# Patient Record
Sex: FEMALE | Race: BLACK OR AFRICAN AMERICAN | Employment: FULL TIME | URBAN - METROPOLITAN AREA
[De-identification: names, ages, dates, MRNs, and addresses within clinical notes are randomized per-mention and may not be internally consistent; named-entity substitution may affect disease eponyms.]

---

## 2017-11-15 ENCOUNTER — HOSPITAL ENCOUNTER (EMERGENCY)
Age: 57
Discharge: HOME | End: 2017-11-16
Attending: EMERGENCY MEDICINE
Payer: COMMERCIAL

## 2017-11-15 VITALS
DIASTOLIC BLOOD PRESSURE: 95 MMHG | BODY MASS INDEX: 38.02 KG/M2 | TEMPERATURE: 97.9 F | WEIGHT: 228.18 LBS | HEART RATE: 88 BPM | SYSTOLIC BLOOD PRESSURE: 184 MMHG | HEIGHT: 65 IN | RESPIRATION RATE: 18 BRPM | OXYGEN SATURATION: 100 %

## 2017-11-15 DIAGNOSIS — N64.4 BREAST PAIN IN FEMALE: Primary | ICD-10-CM

## 2017-11-15 PROCEDURE — 99282 EMERGENCY DEPT VISIT SF MDM: CPT

## 2017-11-16 NOTE — ED NOTES
Edvin Mar. MD Ladi   has done a bedside review of the discharge instructions. The patient is in understanding. The patients line(s) are removed. The patient is dressed, and belongings together for discharge.

## 2017-11-16 NOTE — DISCHARGE INSTRUCTIONS
Breast Pain: Care Instructions  Your Care Instructions    Breast tenderness and pain may come and go with your monthly periods (cyclic), or it may not follow any pattern (noncyclic). Breast pain is rarely caused by a serious health problem. You may need tests to find the cause. Follow-up care is a key part of your treatment and safety. Be sure to make and go to all appointments, and call your doctor if you are having problems. It's also a good idea to know your test results and keep a list of the medicines you take. How can you care for yourself at home? · If your doctor gave you medicine, take it exactly as prescribed. Call your doctor if you think you are having a problem with your medicine. · Take an over-the-counter pain medicine, such as acetaminophen (Tylenol), ibuprofen (Advil, Motrin), or naproxen (Aleve), to relieve pain and swelling. Read and follow all instructions on the label. · Do not take two or more pain medicines at the same time unless the doctor told you to. Many pain medicines have acetaminophen, which is Tylenol. Too much acetaminophen (Tylenol) can be harmful. · Wear a supportive bra, such as a sports bra or a jog bra. · Cut down on the amount of fat in your diet. If you need help planning healthy meals, see a dietitian. · Get at least 30 minutes of exercise on most days of the week. Walking is a good choice. You also may want to do other activities, such as running, swimming, cycling, or playing tennis or team sports. · Keep a healthy sleep pattern. Go to bed at the same time every night, and get up at the same time every day. When should you call for help? Call your doctor now or seek immediate medical care if:  ? · You have new changes in a breast, such as:  ¨ A lump or thickening in your breast or armpit. ¨ A change in the breast's size or shape. ¨ Skin changes, such as dimples or puckers. ¨ Nipple discharge.   ¨ A change in the color or feel of the skin of your breast or the darker area around the nipple (areola). ¨ A change in the shape of the nipple (it may look like it's being pulled into the breast). ? · You have symptoms of a breast infection, such as:  ¨ Increased pain, swelling, redness, or warmth around a breast.  ¨ Red streaks extending from the breast.  ¨ Pus draining from a breast.  ¨ A fever. ? Watch closely for changes in your health, and be sure to contact your doctor if:  ? · Your breast pain does not get better after 1 week. ? · You have a lump or thickening in your breast or armpit. Where can you learn more? Go to http://ilya-yolande.info/. Enter D460 in the search box to learn more about \"Breast Pain: Care Instructions. \"  Current as of: March 20, 2017  Content Version: 11.4  © 6236-6855 [employer]. Care instructions adapted under license by [employer] (which disclaims liability or warranty for this information). If you have questions about a medical condition or this instruction, always ask your healthcare professional. [address] any warranty or liability for your use of this information.

## 2017-11-16 NOTE — ED PROVIDER NOTES
[facility]  EMERGENCY DEPARTMENT HISTORY AND PHYSICAL EXAM       Date of Service: 11/15/2017   Patient Name: Aiden Pandey   YOB: 1960  Medical Record Number: [id number]    History of Presenting Illness     Chief Complaint   Patient presents with    Breast pain     Pt ambulatory to triage with c/o L breast pain that has been going on for a couple of weeks. Pt states after taking a bath tonight, she white drainage come from her nipples. Denies n/v/d. History Provided By:  patient    Additional History: Aiden Pandey is a 56 y.o. female, who presents ambulatory to the ED c/o constant,gradually worsening left breast pain x couple weeks. Pt notes tonight after taking a shower she noticed pus draining from her left breast. She reports compliance with her yearly Mammograms, noting the last one was in February. She denies a family history of breast cancer. Pt specifically denies any fever, congestion, chest pain, abdominal pain, nausea, vomiting, diarrhea, dysuria, or urinary frequency. PCP: None    PMHx: Significant for none  PSHx: Significant for none  Social Hx: -tobacco, -EtOH, -Illicit Drugs     There are no other complaints, changes, or physical findings at this time. Primary Care Provider: None     Past History     Past Medical History:   No past medical history on file. Past Surgical History:   Past Surgical History:   Procedure Laterality Date    HX GYN      D&C    HX GYN      vaginal deliveries    HX GYN      BTL        Family History:   No family history on file. Social History:   Social History   Substance Use Topics    Smoking status: Never Smoker    Smokeless tobacco: Not on file    Alcohol use No        Allergies:   No Known Allergies      Review of Systems   Review of Systems   Constitutional: Negative for activity change, appetite change, chills, fever and unexpected weight change. HENT: Negative for congestion.     Eyes: Negative for pain and visual disturbance. Respiratory: Negative for cough and shortness of breath. Cardiovascular: Negative for chest pain. Gastrointestinal: Negative for abdominal pain, diarrhea, nausea and vomiting. Genitourinary: Negative for dysuria. Musculoskeletal: Positive for myalgias (L breast). Negative for back pain. Skin: Negative for rash. Neurological: Negative for headaches. Physical Exam  Physical Exam   Constitutional: She is oriented to person, place, and time. She appears well-developed and well-nourished. Well appearing, minimal distress   HENT:   Head: Normocephalic and atraumatic. Mouth/Throat: Oropharynx is clear and moist.   Eyes: Conjunctivae and EOM are normal. Pupils are equal, round, and reactive to light. Right eye exhibits no discharge. Left eye exhibits no discharge. Neck: Normal range of motion. Neck supple. Cardiovascular: Normal rate, regular rhythm and normal heart sounds. No murmur heard. Pulmonary/Chest: Effort normal and breath sounds normal. No respiratory distress. She has no wheezes. She has no rales. Abdominal: Soft. Bowel sounds are normal. She exhibits no distension. There is no tenderness. Musculoskeletal: Normal range of motion. She exhibits no edema. Neurological: She is alert and oriented to person, place, and time. No cranial nerve deficit. She exhibits normal muscle tone. Skin: Skin is warm and dry. She is not diaphoretic. L breast: no erythema, induration  Nipple inverted, no discharge   Multiple nodules on L lateral breast at 1-4 o'clock, and one larger at 7 o'clock   Nursing note and vitals reviewed. Medical Decision Making   I am the first provider for this patient. I reviewed the vital signs, available nursing notes, past medical history, past surgical history, family history and social history. Old Medical Records: Old medical records. Nursing notes.      Provider Notes:   DDx: nipple drainage, no exam evidence of abscess. Discussed with US, however they are unable to perform a Us without evidence. ED Course:  10:39 PM   Initial assessment performed. The patients presenting problems have been discussed, and they are in agreement with the care plan formulated and outlined with them. I have encouraged them to ask questions as they arise throughout their visit. Progress note:  10:55 PM  Pt noted to be feeling better, ready for discharge. Updated pt that the ED does not perform breast US and will need follow-up with her OB. Will follow up as instructed. All questions have been answered, pt voiced understanding and agreement with plan. Specific return precautions provided as well as instructions to return to the ED should sx worsen at any time. Vital signs stable for discharge. Written by Toma Jordan ED Scribe, as dictated by Rona Garcia MD      Vital Signs-Reviewed the patient's vital signs. Patient Vitals for the past 12 hrs:   Temp Pulse Resp BP SpO2   11/15/17 2231 97.9 °F (36.6 °C) 88 18 (!) 184/95 100 %       Medications Given in the ED:  Medications - No data to display    Pulse Oximetry Analysis - Normal 100% on RA    Cardiac Monitor:   Rate: 88  Rhythm: Normal Sinus Rhythm      Diagnosis   Clinical Impression:   1. Breast pain in female         Plan:  1:   Follow-up Information     Follow up With Details Comments Contact Carmen Mandel MD Call in 1 day to schedule an appointment and breast ultrasound request Eliecer Liu  [phone]          2:   Current Discharge Medication List        Return to ED if Worse    Disposition Note:  Discharge Note:  10:55 PM  The pt is ready for discharge. The pt's signs, symptoms, diagnosis, and discharge instructions have been discussed and pt has conveyed their understanding. The pt is to follow up as recommended or return to ER should their symptoms worsen.  Plan has been discussed and pt is in agreement.    _______________________________   Attestations: This note is prepared by Brent Cherry, acting as Scribe for Jeffery Knight MD    The scribe's documentation has been prepared under my direction and personally reviewed by me in its entirety. I confirm that the note above accurately reflects all work, treatment, procedures, and medical decision making performed by me.   Jeffery Knight MD        _______________________________

## 2017-11-16 NOTE — ED NOTES
Assumed care of patient from triage. The patient reports discharge from the left breast. Reports mammogram every year, last one was in February. Reports pain for the past two weeks.  MD Termeer at the bedside

## 2018-08-15 ENCOUNTER — OFFICE VISIT (OUTPATIENT)
Dept: ENDOCRINOLOGY | Age: 58
End: 2018-08-15

## 2018-08-15 VITALS
DIASTOLIC BLOOD PRESSURE: 82 MMHG | HEIGHT: 65 IN | HEART RATE: 78 BPM | SYSTOLIC BLOOD PRESSURE: 156 MMHG | WEIGHT: 228 LBS | BODY MASS INDEX: 37.99 KG/M2

## 2018-08-15 DIAGNOSIS — E22.1 HYPERPROLACTINEMIA (HCC): Primary | ICD-10-CM

## 2018-08-15 RX ORDER — MELOXICAM 15 MG/1
15 TABLET ORAL AS NEEDED
COMMUNITY
End: 2020-01-20

## 2018-08-15 RX ORDER — AMLODIPINE BESYLATE 5 MG/1
5 TABLET ORAL
COMMUNITY
Start: 2017-11-29 | End: 2018-11-29

## 2018-08-15 NOTE — MR AVS SNAPSHOT
[address] [address] [phone] Patient: Ingrid Richards MRN: [id number] Visit Information Date & Time Provider Department Dept. Phone Encounter #  
 8/15/2018  2:50 PM Oriana Jacob, [address] [place] [address] Diabetes and Endocrinology [phone] Follow-up Instructions Return in about 3 months (around 11/15/2018). Upcoming Health Maintenance Date Due Hepatitis C Screening 1960 DTaP/Tdap/Td series (1 - Tdap) 7/19/1983 PAP AKA CERVICAL CYTOLOGY 7/19/1983 BREAST CANCER SCRN MAMMOGRAM 7/19/2012 FOBT Q 1 YEAR AGE 50-75 7/19/2012 Influenza Age 5 to Adult 8/1/2018 Allergies as of 8/15/2018  Review Complete On: 8/15/2018 By: Kishan Weiss LPN No Known Allergies Current Immunizations  Never Reviewed No immunizations on file. Not reviewed this visit You Were Diagnosed With   
  
 Codes Comments Hyper[facility]lactin[facility] ([facility]    -  Primary ICD-10-CM: E22.1 ICD-9-CM: 253.1 Vitals BP Pulse Height(growth percentile) Weight(growth percentile) BMI OB Status 156/82 (BP 1 Location: Right arm, BP Patient Position: Sitting) 78 5' 5\" (1.651 m) 228 lb (103.4 kg) 37.94 kg/m2 Postmenopausal  
 Smoking Status Never Smoker Vitals History BMI and BSA Data Body Mass Index Body Surface Area  
 37.94 kg/m 2 2.18 m 2 Preferred Pharmacy Pharmacy Name Phone [facility] [phone] Your Updated Medication List  
  
   
This list is accurate as of 8/15/18  3:17 PM.  Always use your most recent med list. amLODIPine 5 mg tablet Commonly known as:  Gutierrez Carrington Take 5 mg by mouth.  
  
 meloxicam 15 mg tablet Commonly known as:  MOBIC Take 15 mg by mouth as needed for Pain. We Performed the Following COLLECTION VENOUS BLOOD,VENIPUNCTURE P9929014 CPT(R)] [place] HAND[facility]&/OR CONVEY OF SPEC FOR TR OFFICE TO LAB [70402 CPT(R)] PROLACTIN [89447 CPT(R)] T4, FREE P5386112 CPT(R)] TSH 3RD GENERATION [20227 CPT(R)] Follow-up Instructions Return in about 3 months (around 11/15/2018). Introducing [facility]! [facility] introduces [employer] patient portal. Now you can access parts of your medical record, email your doctor's office, and request medication refills online. 1. In your internet browser, go to [email or web address] 2. Click on the First Time User? Click Here link in the Sign In box. You will see the New Member Sign Up page. 3. Enter your [employer] Access Code exactly as it appears below. You will not need to use this code after youve completed the sign-up process. If you do not sign up before the expiration date, you must request a new code. · [employer] Access Code: [email or web address] Expires: 11/13/2018  3:17 PM 
 
4. Enter the last four digits of your Social Security Number (xxxx) and Date of Birth (mm/dd/yyyy) as indicated and click Submit. You will be taken to the next sign-up page. 5. Create a [employer] ID. This will be your [employer] login ID and cannot be changed, so think of one that is secure and easy to remember. 6. Create a [employer] password. You can change your password at any time. 7. Enter your Password Reset Question and Answer. This can be used at a later time if you forget your password. 8. Enter your e-mail address. You will receive e-mail notification when new information is available in [address] 9. Click Sign Up. You can now view and download portions of your medical record. 10. Click the Download Summary menu link to download a portable copy of your medical information. If you have questions, please visit the Frequently Asked Questions section of the [employer] website.  Remember, [employer] is NOT to be used for urgent needs. For medical emergencies, dial 911. Now available from your iPhone and Android! Please provide this summary of care documentation to your next provider. Your primary care clinician is listed as Bishnu Walsh. If you have any questions after today's visit, please call [phone].

## 2018-08-15 NOTE — LETTER
8/15/2018 2:50 PM 
 
Patient: Marga Bui YOB: 1960 Date of Visit: 8/15/2018 Dear Severiano Liu MD 
[address] [place] [address] VIA Facsimile: [phone] 
 : Thank you for referring Ms. Marga Bui to me for evaluation/treatment. Below are the relevant portions of my assessment and plan of care. If you have questions, please do not hesitate to call me. I look forward to following Ms. Concetta Aguilar along with you. Sincerely, Alveda Dakin, MD

## 2018-08-15 NOTE — PROGRESS NOTES
Chief Complaint   Patient presents with    Pituitary Problem     pcp and pharmacy verified   Records reviewed. History of Present Illness: Belem Whitehead is a 66 y.o. female who I was asked to see in consult by Dr. Carmen Espinoza for evaluation of hyperprolactinemia. On 11/16/17 pt was in the ED for left breast pain. Per the notes she had breast pain for two weeks and noted milky discharge from the left nipple. On physical exam she had left breast induration, no erythema, but + nodules in the left breast and an inverted left nipple. Pt notes the discharge stopped, after she was started on Abx and the induration on the breast resolved. She notes that about a month ago started to recur. She noted that the recurrence of the nipple discharge came a couple of days after she had recurrence of the pain. She notes that she was having redness and rash on the breast BEFORE recurrence of the nipple discharge. Prior to November 2017 she has never had the nipple discharge, or rash. She denies any injury or trauma to her breast tissue prior to the onset of her symptoms. Pt notes that there is no change she could be pregnant as she had tubal ligation in her 30's. She is no longer having the discharge from the nipple or breast pain. She notes that Dr. Deja Hartman put her on Abx and \"everything cleared up\" and she has not had the discharge since. She has not had any galactorrhea since early July. She denies issues of frequent headaches. Her LMP was in her 42's. On 7/6/18 her prolactin level was 122.8 and her TSH was 0.70. She was sent for an MRI of the brain, which showed no evidence of pituitary mass or adenoma (imaging reviewed). She was then referred to Dr. Ebonie Cortez of general surgery for evaluation for further evaluation. \"He did see anything that he could do\".       Past Medical History:   Diagnosis Date    Hypertension      Past Surgical History:   Procedure Laterality Date    HX GYN      D&C    HX GYN      vaginal deliveries    HX GYN      BTL     Current Outpatient Prescriptions   Medication Sig    amLODIPine (NORVASC) 5 mg tablet Take 5 mg by mouth.  meloxicam (MOBIC) 15 mg tablet Take 15 mg by mouth as needed for Pain. No current facility-administered medications for this visit. No Known Allergies  Family History   Problem Relation Age of Onset    Lung Cancer Mother     Cancer Mother      Lung Cancer    Hypertension Mother     Cancer Father      leukemia    No Known Problems Sister     No Known Problems Brother     No Known Problems Maternal Grandmother     No Known Problems Maternal Grandfather     No Known Problems Paternal Grandmother     No Known Problems Paternal Grandfather     Cancer Brother      Prostate     Social History     Social History    Marital status: UNKNOWN     Spouse name: N/A    Number of children: N/A    Years of education: N/A     Occupational History    Not on file. Social History Main Topics    Smoking status: Never Smoker    Smokeless tobacco: Never Used    Alcohol use No    Drug use: No    Sexual activity: Not on file     Other Topics Concern    Not on file     Social History Narrative     Review of Systems:  - Constitutional Symptoms: no fevers, chills, weight loss  - Eyes: no blurry vision or double vision  - Cardiovascular: no chest pain or palpitations  - Respiratory: no cough or shortness of breath  - Gastrointestinal: no dysphagia or abdominal pain  - Musculoskeletal: no joint pains or weakness  - Integumentary: no rashes  - Neurological: no numbness, tingling, or headaches  - Psychiatric: no depression or anxiety  - Endocrine: no heat or cold intolerance, no polyuria or polydipsia    Physical Examination:  Blood pressure 156/82, pulse 78, height 5' 5\" (1.651 m), weight 228 lb (103.4 kg).   - General: pleasant, no distress, good eye contact  - HEENT: no exopthalmos, no periorbital edema, no scleral/conjunctival injection, EOMI, no lid lag or stare  - Neck: supple, no thyromegaly, masses, lymph nodes, or carotid bruits, no supraclavicular or dorsocervical fat pads  - Cardiovascular: regular, normal rate, normal S1 and S2, no murmurs/rubs/gallops, 2+ dorsalis pedis pulses bilaterally        -     Breast: Both nipples are inverted (chronic per pt's report). No tenderness, pain, erythema, masses or discharge noted bilaterally  - Respiratory: clear to auscultation bilaterally  - Gastrointestinal: soft, nontender, nondistended, no masses, no hepatosplenomegaly  - Musculoskeletal: no proximal muscle weakness in upper or lower extremities  - Integumentary: + acanthosis nigricans, no rashes, no edema,   - Neurological: reflexes 2+ at biceps, no tremor  - Psychiatric: normal mood and affect    Data Reviewed:   Multiple sagittal, axial and coronal MR images were performed throughout the brain, as well as diffusion weighted axial images. Postcontrast images were obtained following IV injection of paramagnetic contrast agent. REPORT: The ventricular system is midline and symmetrical and normal in size and configuration for age. The cisterns and sulci over the convexity of the brain appear normal. There is no evidence of midline shift, mass, diffusion abnormality, acute infarction or other active abnormality. Although the study was not detailed of the pituitary region, the pituitary appears unremarkable without measurable mass. There are no enhancing abnormalities noted on the postcontrast images. Component Name  7/6/2018     0.70   Thryoid Stimulating Hormone    PITUITARY RELATED  Component Name  7/6/2018     122.8 (H)  Prolactin       Assessment/Plan:   1. Hyperprolactinemia (HCC)    1) The pattern of breast pain and rash which predates the onset of galactorrhea fits with a mechanical stimulation and pain which can send a feedback signal to the pituitary to stimulate prolactin production. The elevated prolactin then leads to the galactorrhea.  The resolution of the galactorrhea after treatment and resolution of the rash (infection) and pain also fits this pattern. She she is not having any breast pain, swelling, erythema, rash or galactorrhea, I am going to check a prolactin level today to see if her prolactin level has decreased. My suspicion is that it will be lower and that there is no further treatment she will need. Her MRI of the pituitary did not show any evidence of pituitary adenoma, which further supports my suspicion. We discussed at length the pituitary-Mammary axis and the various stimulations that control this axis (hormonal, mechanical and neurological). Pt voices understanding and agreement with the plan. RTC 3 months       Follow-up Disposition:  Return in about 3 months (around 11/15/2018).     Copy sent to:  Dr. Caesar Yang

## 2018-08-16 LAB
PROLACTIN SERPL-MCNC: 99.4 NG/ML (ref 4.8–23.3)
T4 FREE SERPL-MCNC: 0.88 NG/DL (ref 0.82–1.77)
TSH SERPL DL<=0.005 MIU/L-ACNC: 0.84 UIU/ML (ref 0.45–4.5)

## 2018-08-16 NOTE — PROGRESS NOTES
Called pt regarding her labs. Her Prolactin level is still elevated, but has decreased. For now I will continue to monitor, but I don't feel she needs to be started on cabergoline or any anti-prolactin medications.

## 2018-11-15 ENCOUNTER — OFFICE VISIT (OUTPATIENT)
Dept: ENDOCRINOLOGY | Age: 58
End: 2018-11-15

## 2018-11-15 VITALS
HEART RATE: 79 BPM | DIASTOLIC BLOOD PRESSURE: 80 MMHG | BODY MASS INDEX: 38.45 KG/M2 | SYSTOLIC BLOOD PRESSURE: 148 MMHG | WEIGHT: 230.8 LBS | HEIGHT: 65 IN

## 2018-11-15 DIAGNOSIS — E22.1 HYPERPROLACTINEMIA (HCC): Primary | ICD-10-CM

## 2018-11-15 RX ORDER — AMLODIPINE BESYLATE 10 MG/1
TABLET ORAL DAILY
COMMUNITY
Start: 2018-11-01

## 2018-11-15 NOTE — PROGRESS NOTES
Chief Complaint   Patient presents with    Pituitary Problem     pcp and pharmacy verified   Records since last visit reviewed. History of Present Illness: Oscar De Guzman is a 66 y.o. female here for follow up of hyperprolactinemia. On 11/16/17 pt was in the ED for left breast pain. Per the notes she had breast pain for two weeks and noted milky discharge from the left nipple. On physical exam she had left breast induration, no erythema, but + nodules in the left breast and an inverted left nipple. On 7/6/18 her prolactin level was 122.8 and her TSH was 0.70. She was sent for an MRI of the brain, which showed no evidence of pituitary mass or adenoma (imaging reviewed). She was then referred to Dr. Wendy Fink of general surgery for evaluation for further evaluation. \"He did see anything that he could do\". At our initial visit in August 2018 the discharge from her nipple and the breast pain had resolved. She notes that Dr. Danna Brooke put her on Abx and \"everything cleared up\" and she has not had the discharge since. She has not had any galactorrhea since early July 2018. Her Prolactin level was 99, with a TSH of 0.84. Her Prolactin level was decreasing, she was not having any discharge from her breasts and her MRI was negative. We agreed to not start cabergoline or any anti-prolactin medications at that time. Pt notes she will still have issue of tenderness in her breast about every 3 weeks. On those days she will notice a \"crusting\" on the inside of her bra when she removes it. She has not seen any discharge of the nipple. She has not had any issues of induration, redness or swelling. She denies issues of CP, SOB, HA, N/V, vision changes. She denies issues of frequent headaches. Her LMP was in her 42's, she had tubal ligation in her 30's.           Current Outpatient Medications   Medication Sig    amLODIPine (NORVASC) 10 mg tablet     meloxicam (MOBIC) 15 mg tablet Take 15 mg by mouth as needed for Pain.  amLODIPine (NORVASC) 5 mg tablet Take 5 mg by mouth. No current facility-administered medications for this visit. No Known Allergies  Review of Systems:  - Cardiovascular: no chest pain  - Neurological: no tremors  - Integumentary: skin is normal    Physical Examination:  Blood pressure 148/80, pulse 79, height 5' 5\" (1.651 m), weight 230 lb 12.8 oz (104.7 kg). - General: pleasant, no distress, good eye contact   - Neck: no thyromegaly or thyroid bruits  - Cardiovascular: regular, normal rate, nl s1 and s2, no m/r/g   - Integumentary: skin is normal, no edema  - Neurological: reflexes 2+ at biceps, no tremors  - Psychiatric: normal mood and affect    Data Reviewed:   - none new for review    Assessment/Plan:   1) Hyperprolactinemia > Pt is still having evidence of galactorrhea, but it only seems to occur on days she has breast pain/tenderness. We discussed starting pt on Cabergoline 0.5mg twice per week. Will check a prolactin level today and if her level is still high, the we will start the cabergoline and see pt back in 3 months. Pt voices understanding and agreement with the plan. RTC 3 months    Patient Instructions   1) Cabergoline 0.5mg tablet. Take 1/2 a tablet twice per week for two weeks, then increase to a whole tablet twice per week. Cabergoline (By mouth)   Cabergoline ([id number]-leen)  Lowers high levels of prolactin (a hormone) in your blood. Brand Name(s):   There may be other brand names for this medicine. When This Medicine Should Not Be Used: This medicine is not right for everyone. Do not use it if you had an allergic reaction to cabergoline or to any ergot medicine. Do not use it if you have uncontrolled high blood pressure, a history of heart problems (such as heart valve disease), or fibrotic disorder (scarring) of the heart, lungs, or stomach. How to Use This Medicine:   Tablet  · Take your medicine as directed.  Your dose may need to be changed several times to find what works best for you. · Missed dose: Take a dose as soon as you remember. If it is almost time for your next dose, wait until then and take a regular dose. Do not take extra medicine to make up for a missed dose. · Store the medicine in a closed container at room temperature, away from heat, moisture, and direct light. Drugs and Foods to Avoid:   Ask your doctor or pharmacist before using any other medicine, including over-the-counter medicines, vitamins, and herbal products. · Some medicines can affect how cabergoline works. Tell your doctor if you are taking any of the following:   ¨ Bromocriptine  ¨ Medicine that could lower your blood pressure, such as diuretics (water pills)  ¨ Metoclopramide  ¨ Phenothiazine medicine, such as prochlorperazine or chlorpromazine  ¨ Thiothixene medicine  Warnings While Using This Medicine:   · Tell your doctor if you are pregnant or breastfeeding, or if you think you might become pregnant while you are using this medicine. · Tell your doctor if you have liver disease, breathing or lung problems, heart disease, or high blood pressure. · This medicine may increase your risk for serious heart, kidney, lung, or stomach problems. · The first few doses of cabergoline may make you lightheaded or dizzy, especially when you get up from a lying or sitting position. Get up slowly. Do not drive or use machines if you are not alert. · Your doctor will check your progress and the effects of this medicine at regular visits. Keep all appointments. You may need a blood test or other tests to check for unwanted effects. · Keep all medicine out of the reach of children. Never share your medicine with anyone.   Possible Side Effects While Using This Medicine:   Call your doctor right away if you notice any of these side effects:  · Allergic reaction: Itching or hives, swelling in your face or hands, swelling or tingling in your mouth or throat, chest tightness, trouble breathing  · Blood in your urine, lower back pain, side pain, or sharp back pain just below the ribs  · Change in how much or how often you urinate  · Chest pain or tightness  · Cough, shortness of breath, or trouble breathing  · Fast, slow, pounding, or uneven heartbeat  · Lightheadedness, dizziness, or fainting  · Numbness, tingling, or burning pain in your hands, arms, legs, or feet  · Severe stomach pain or tenderness  · Swelling in your face, hands, ankles, lower legs, or feet  · Unusual sleepiness, drowsiness, or weakness  If you notice these less serious side effects, talk with your doctor:   · Headache  · Nausea, vomiting, constipation, mild stomach pain, or upset stomach  If you notice other side effects that you think are caused by this medicine, tell your doctor. Call your doctor for medical advice about side effects. You may report side effects to FDA at 6-796-FDA-0148  © 2017 [facility] Information is for End User's use only and may not be sold, redistributed or otherwise used for commercial purposes. The above information is an  only. It is not intended as medical advice for individual conditions or treatments. Talk to your doctor, nurse or pharmacist before following any medical regimen to see if it is safe and effective for you. Follow-up Disposition:  Return in about 3 months (around 2/15/2019).     Copy sent to:  Dr. Amarjit Barahona

## 2018-11-15 NOTE — PATIENT INSTRUCTIONS
1) Cabergoline 0.5mg tablet. Take 1/2 a tablet twice per week for two weeks, then increase to a whole tablet twice per week. Cabergoline (By mouth)   Cabergoline (Tiana)  Lowers high levels of prolactin (a hormone) in your blood. Brand Name(s):   There may be other brand names for this medicine. When This Medicine Should Not Be Used: This medicine is not right for everyone. Do not use it if you had an allergic reaction to cabergoline or to any ergot medicine. Do not use it if you have uncontrolled high blood pressure, a history of heart problems (such as heart valve disease), or fibrotic disorder (scarring) of the heart, lungs, or stomach. How to Use This Medicine:   Tablet  · Take your medicine as directed. Your dose may need to be changed several times to find what works best for you. · Missed dose: Take a dose as soon as you remember. If it is almost time for your next dose, wait until then and take a regular dose. Do not take extra medicine to make up for a missed dose. · Store the medicine in a closed container at room temperature, away from heat, moisture, and direct light. Drugs and Foods to Avoid:   Ask your doctor or pharmacist before using any other medicine, including over-the-counter medicines, vitamins, and herbal products. · Some medicines can affect how cabergoline works. Tell your doctor if you are taking any of the following:   ¨ Bromocriptine  ¨ Medicine that could lower your blood pressure, such as diuretics (water pills)  ¨ Metoclopramide  ¨ Phenothiazine medicine, such as prochlorperazine or chlorpromazine  ¨ Thiothixene medicine  Warnings While Using This Medicine:   · Tell your doctor if you are pregnant or breastfeeding, or if you think you might become pregnant while you are using this medicine. · Tell your doctor if you have liver disease, breathing or lung problems, heart disease, or high blood pressure.   · This medicine may increase your risk for serious heart, kidney, lung, or stomach problems. · The first few doses of cabergoline may make you lightheaded or dizzy, especially when you get up from a lying or sitting position. Get up slowly. Do not drive or use machines if you are not alert. · Your doctor will check your progress and the effects of this medicine at regular visits. Keep all appointments. You may need a blood test or other tests to check for unwanted effects. · Keep all medicine out of the reach of children. Never share your medicine with anyone. Possible Side Effects While Using This Medicine:   Call your doctor right away if you notice any of these side effects:  · Allergic reaction: Itching or hives, swelling in your face or hands, swelling or tingling in your mouth or throat, chest tightness, trouble breathing  · Blood in your urine, lower back pain, side pain, or sharp back pain just below the ribs  · Change in how much or how often you urinate  · Chest pain or tightness  · Cough, shortness of breath, or trouble breathing  · Fast, slow, pounding, or uneven heartbeat  · Lightheadedness, dizziness, or fainting  · Numbness, tingling, or burning pain in your hands, arms, legs, or feet  · Severe stomach pain or tenderness  · Swelling in your face, hands, ankles, lower legs, or feet  · Unusual sleepiness, drowsiness, or weakness  If you notice these less serious side effects, talk with your doctor:   · Headache  · Nausea, vomiting, constipation, mild stomach pain, or upset stomach  If you notice other side effects that you think are caused by this medicine, tell your doctor. Call your doctor for medical advice about side effects. You may report side effects to FDA at 2-957-FDA-7044  © 2017 [address] Information is for End User's use only and may not be sold, redistributed or otherwise used for commercial purposes. The above information is an  only.  It is not intended as medical advice for individual conditions or treatments. Talk to your doctor, nurse or pharmacist before following any medical regimen to see if it is safe and effective for you.

## 2018-11-16 LAB — PROLACTIN SERPL-MCNC: 103.1 NG/ML (ref 4.8–23.3)

## 2018-11-16 RX ORDER — CABERGOLINE 0.5 MG/1
0.5 TABLET ORAL 2 TIMES WEEKLY
Qty: 8 TAB | Refills: 5 | Status: SHIPPED | OUTPATIENT
Start: 2018-11-16 | End: 2019-06-10

## 2018-11-16 NOTE — PROGRESS NOTES
Pt's Prolactin has not improved and she is still having symptoms. Will start pt on Cabergoline 0.5mg twice weekly. Pt voices understanding and agreement with the plan.

## 2019-03-07 ENCOUNTER — OFFICE VISIT (OUTPATIENT)
Dept: ENDOCRINOLOGY | Age: 59
End: 2019-03-07

## 2019-03-07 VITALS
WEIGHT: 224.6 LBS | DIASTOLIC BLOOD PRESSURE: 76 MMHG | HEART RATE: 70 BPM | HEIGHT: 65 IN | BODY MASS INDEX: 37.42 KG/M2 | SYSTOLIC BLOOD PRESSURE: 151 MMHG

## 2019-03-07 DIAGNOSIS — E22.1 HYPERPROLACTINEMIA (HCC): Primary | ICD-10-CM

## 2019-03-07 RX ORDER — METFORMIN HYDROCHLORIDE 500 MG/1
500 TABLET, EXTENDED RELEASE ORAL DAILY
COMMUNITY
Start: 2019-02-21

## 2019-03-07 NOTE — PROGRESS NOTES
Chief Complaint   Patient presents with    Pituitary Problem     prolactin. PCP and pharmacy verified   Records since last visit reviewed. History of Present Illness: David Evangelista is a 66 y.o. female here for follow up of hyperprolactinemia. On 11/16/17 pt was in the ED for left breast pain. Per the notes she had breast pain for two weeks and noted milky discharge from the left nipple. On physical exam she had left breast induration, no erythema, but + nodules in the left breast and an inverted left nipple. On 7/6/18 her prolactin level was 122.8 and her TSH was 0.70. She was sent for an MRI of the brain, which showed no evidence of pituitary mass or adenoma (imaging reviewed). She was then referred to Dr. Cristal aSnta of general surgery for evaluation for further evaluation. \"He did see anything that he could do\". At our initial visit in August 2018 the discharge from her nipple and the breast pain had resolved. She notes that Dr. Jenaro Valenzuela put her on Abx and \"everything cleared up\" and she has not had the discharge since. She has not had any galactorrhea since early July 2018. Her Prolactin level was 99, with a TSH of 0.84. Her Prolactin level was decreasing, she was not having any discharge from her breasts and her MRI was negative. We agreed to not start cabergoline or any anti-prolactin medications at that time. However at our follow up visit in November 2018 her Prolactin level was still elevated at 103 and she had started to have episodes of galactorrhea and breast tenderness, so I started her on Cabergoline 0.5mg BIW. Pt notes that around Valentines' she had mastitis in her right breast and that was treated with Abx. She notes that the swelling has improved, but she still has occasional tenderness. She notes the galactorrhea has stopped. She denies issues of CP, SOB, palpitation, HA, N/V, vision changes. She denies issues of frequent headaches.   Her LMP was in her 42's, she had tubal ligation in her 30's. Current Outpatient Medications   Medication Sig    metFORMIN ER (GLUCOPHAGE XR) 500 mg tablet Take 500 mg by mouth daily.  cabergoline (DOSTINEX) 0.5 mg tablet Take 1 Tab by mouth two (2) times a week.  amLODIPine (NORVASC) 10 mg tablet daily.  meloxicam (MOBIC) 15 mg tablet Take 15 mg by mouth as needed for Pain. No current facility-administered medications for this visit. No Known Allergies  Review of Systems:  - Cardiovascular: no chest pain  - Neurological: no tremors  - Integumentary: skin is normal    Physical Examination:  Blood pressure 151/76, pulse 70, height 5' 5\" (1.651 m), weight 224 lb 9.6 oz (101.9 kg). - General: pleasant, no distress, good eye contact   - Neck: no thyromegaly or thyroid bruits  - Cardiovascular: regular, normal rate, nl s1 and s2, no m/r/g   - Integumentary: skin is normal, no edema  - Neurological: reflexes 2+ at biceps, no tremors  - Psychiatric: normal mood and affect    Data Reviewed:   - none new for review    Assessment/Plan:   1) Hyperprolactinemia > Pt is not having the galactorrhea any longer. She had an episode of mastitis in her right breast in February 2019, but other than that no other issues of breast tenderness. Will check a prolactin level today and adjust her Cabergoline as needed. She will need a repeat MRI in November 2019. Pt voices understanding and agreement with the plan. RTC 3 months    Patient Instructions   Continue your Cabergoline on Sunday and Wednesday, I will call you with the results of your labs. Follow-up Disposition:  Return in about 3 months (around 6/7/2019).     Copy sent to:  Dr. Wang Alvarez

## 2019-03-08 LAB — PROLACTIN SERPL-MCNC: 5.2 NG/ML (ref 4.8–23.3)

## 2019-06-05 DIAGNOSIS — E22.1 HYPERPROLACTINEMIA (HCC): ICD-10-CM

## 2019-06-06 LAB — PROLACTIN SERPL-MCNC: 6.6 NG/ML (ref 4.8–23.3)

## 2019-06-10 ENCOUNTER — OFFICE VISIT (OUTPATIENT)
Dept: ENDOCRINOLOGY | Age: 59
End: 2019-06-10

## 2019-06-10 VITALS
DIASTOLIC BLOOD PRESSURE: 66 MMHG | BODY MASS INDEX: 36.85 KG/M2 | HEIGHT: 65 IN | HEART RATE: 77 BPM | SYSTOLIC BLOOD PRESSURE: 134 MMHG | WEIGHT: 221.2 LBS

## 2019-06-10 DIAGNOSIS — E22.1 HYPERPROLACTINEMIA (HCC): Primary | ICD-10-CM

## 2019-06-10 RX ORDER — CABERGOLINE 0.5 MG/1
0.25 TABLET ORAL 2 TIMES WEEKLY
Qty: 8 TAB | Refills: 5 | Status: SHIPPED | OUTPATIENT
Start: 2019-06-10 | End: 2019-11-08

## 2019-06-10 RX ORDER — IRBESARTAN 75 MG/1
150 TABLET ORAL
Refills: 1 | COMMUNITY
Start: 2019-05-22 | End: 2020-01-20

## 2019-06-10 NOTE — PROGRESS NOTES
Chief Complaint   Patient presents with    Pituitary Problem     prolactin level. PCP and pharmacy verified   Records since last visit reviewed. History of Present Illness: Wilfredo Pérez is a 66 y.o. female here for follow up of hyperprolactinemia. On 11/16/17 pt was in the ED for left breast pain. Per the notes she had breast pain for two weeks and noted milky discharge from the left nipple. On physical exam she had left breast induration, no erythema, but + nodules in the left breast and an inverted left nipple. On 7/6/18 her prolactin level was 122.8 and her TSH was 0.70. She was sent for an MRI of the brain, which showed no evidence of pituitary mass or adenoma (imaging reviewed). She was then referred to Dr. Antonina Ross of general surgery for evaluation for further evaluation. \"He did see anything that he could do\". At our initial visit in August 2018 the discharge from her nipple and the breast pain had resolved. She notes that Dr. Tata Medina put her on Abx and \"everything cleared up\" and she has not had the discharge since. She has not had any galactorrhea since early July 2018. Her Prolactin level was 99, with a TSH of 0.84. Her Prolactin level was decreasing, she was not having any discharge from her breasts and her MRI was negative. We agreed to not start cabergoline or any anti-prolactin medications at that time. However at our follow up visit in November 2018 her Prolactin level was still elevated at 103 and she had started to have episodes of galactorrhea and breast tenderness, so I started her on Cabergoline 0.5mg BIW. In February 2019 she had an episode of mastitis in her right breast, when this was treated with Abx the galactorrhea resolved. Her Prolactin level last week was 6.6. Pt is still taking the Cabergolin 0.5mg twice per week. She has not had any more infection or Mastitis.  She notes that she will still have pain in the breast and then she will have discharge from the breast.  She notes she has not had any issues of the discharge \"in awhile\". She is having some tenderness in her breast for the last week. She denies issues of CP, SOB, palpitation, HA, N/V, vision changes. She denies issues of frequent headaches. Her LMP was in her 42's, she had tubal ligation in her 30's. Current Outpatient Medications   Medication Sig    irbesartan (AVAPRO) 75 mg tablet TAKE 1 TABLET BY MOUTH AT BEDTIME    cabergoline (DOSTINEX) 0.5 mg tablet Take 0.5 Tabs by mouth two (2) times a week.  metFORMIN ER (GLUCOPHAGE XR) 500 mg tablet Take 500 mg by mouth daily.  amLODIPine (NORVASC) 10 mg tablet daily.  meloxicam (MOBIC) 15 mg tablet Take 15 mg by mouth as needed for Pain. No current facility-administered medications for this visit. No Known Allergies  Review of Systems:  - Cardiovascular: no chest pain  - Neurological: no tremors  - Integumentary: skin is normal    Physical Examination:  Blood pressure 134/66, pulse 77, height 5' 5\" (1.651 m), weight 221 lb 3.2 oz (100.3 kg). - General: pleasant, no distress, good eye contact   - Neck: no thyromegaly or thyroid bruits  - Cardiovascular: regular, normal rate, nl s1 and s2, no m/r/g   - Integumentary: skin is normal, no edema  - Neurological: reflexes 2+ at biceps, no tremors  - Psychiatric: normal mood and affect    Data Reviewed:   Component      Latest Ref Rng & Units 6/5/2019           7:50 AM   Prolactin      4.8 - 23.3 ng/mL 6.6       Assessment/Plan:   1) Hyperprolactinemia > Pt is not having the galactorrhea any longer. She is having some breast pain, but no galactorrhea. Her Prolactin level last week was 6.6. Pt instructed to decrease her Cabergoline to 0.25mg twice per week. She will need a repeat MRI in November 2019. Pt voices understanding and agreement with the plan. RTC 3 months      Follow-up and Dispositions    · Return in about 3 months (around 9/10/2019).          Copy sent to:   Lita Mcadams

## 2019-09-05 LAB — PROLACTIN SERPL-MCNC: 7.9 NG/ML (ref 4.8–23.3)

## 2019-09-10 DIAGNOSIS — E22.1 HYPERPROLACTINEMIA (HCC): ICD-10-CM

## 2019-11-08 ENCOUNTER — OFFICE VISIT (OUTPATIENT)
Dept: ENDOCRINOLOGY | Age: 59
End: 2019-11-08

## 2019-11-08 VITALS
BODY MASS INDEX: 38.22 KG/M2 | HEART RATE: 78 BPM | WEIGHT: 229.4 LBS | DIASTOLIC BLOOD PRESSURE: 79 MMHG | HEIGHT: 65 IN | SYSTOLIC BLOOD PRESSURE: 155 MMHG

## 2019-11-08 DIAGNOSIS — E22.1 HYPERPROLACTINEMIA (HCC): Primary | ICD-10-CM

## 2019-11-08 NOTE — PROGRESS NOTES
Chief Complaint   Patient presents with    Pituitary Problem     pcp and pharmacy verified   Records since last visit reviewed. History of Present Illness: Eufemia Monahan is a 64 y.o. female here for follow up of hyperprolactinemia. On 11/16/17 pt was in the ED for left breast pain. Per the notes she had breast pain for two weeks and noted milky discharge from the left nipple. On physical exam she had left breast induration, no erythema, but + nodules in the left breast and an inverted left nipple. On 7/6/18 her prolactin level was 122.8 and her TSH was 0.70. She was sent for an MRI of the brain, which showed no evidence of pituitary mass or adenoma. She was then referred to Dr. Rose Wesley of general surgery for evaluation for further evaluation. \"He did see anything that he could do\". At our initial visit in August 2018 the discharge from her nipple and the breast pain had resolved. She notes that Dr. Aniket Berry put her on Abx and \"everything cleared up\" and she has not had the discharge since. She has not had any galactorrhea since early July 2018. Her Prolactin level was 99, with a TSH of 0.84. Her Prolactin level was decreasing, she was not having any discharge from her breasts and her MRI was negative. We agreed to not start cabergoline or any anti-prolactin medications at that time. However at our follow up visit in November 2018 her Prolactin level was still elevated at 103 and she had started to have episodes of galactorrhea and breast tenderness, so I started her on Cabergoline 0.5mg BIW. In February 2019 she had an episode of mastitis in her right breast, when this was treated with Abx the galactorrhea resolved. At our last visit in June 2019 her Prolactin level was 6.6. I instructed her to decrease her Cabergoline from 0.5mg twice weekly to 0.25mg twice weekly. Pt's most recent prolactin level in September 2019 was 7.9.     Pt had a Mammogram in October and \"they found a spot on my breast (right). I am going for a biopsy this month\". She has not been taking the Cabergoline since June 2019. The levels in September 2019 were after pt has been off the Cabergoline for 3 months. She has not had any more infection or Mastitis. She notes that she will occasionally have discharge from the left breast \"every now and then\". She notes she has not had any issues of the discharge for the last month. She is having some tenderness in her breast, on occasions. She denies issues of CP, SOB, palpitation, HA, N/V, vision changes. She denies issues of frequent headaches. Her LMP was in her 42's, she had tubal ligation in her 30's. Current Outpatient Medications   Medication Sig    irbesartan (AVAPRO) 75 mg tablet TAKE 1 TABLET BY MOUTH AT BEDTIME    metFORMIN ER (GLUCOPHAGE XR) 500 mg tablet Take 500 mg by mouth daily.  amLODIPine (NORVASC) 10 mg tablet daily.  meloxicam (MOBIC) 15 mg tablet Take 15 mg by mouth as needed for Pain. No current facility-administered medications for this visit. No Known Allergies  Review of Systems:  - Cardiovascular: no chest pain  - Neurological: no tremors  - Integumentary: skin is normal    Physical Examination:  Blood pressure 155/79, pulse 78, height 5' 5\" (1.651 m), weight 229 lb 6.4 oz (104.1 kg). - General: pleasant, no distress, good eye contact   - Neck: no thyromegaly or thyroid bruits  - Cardiovascular: regular, normal rate, nl s1 and s2, no m/r/g   - Integumentary: skin is normal, no edema  - Neurological: reflexes 2+ at biceps, no tremors  - Psychiatric: normal mood and affect    Data Reviewed:   Component      Latest Ref Rng & Units 9/4/2019           9:39 AM   Prolactin      4.8 - 23.3 ng/mL 7.9       Assessment/Plan:   1) Hyperprolactinemia > Her Prolactin level in September 2019 was 7.9. This was after she had stopped taking the Cabergoline for 3 months. She has now been off the Cabergoline for about 6 months.  She notes that she has occasional breast tenderness and notes \"crusting at the nipples on occasions\". Her MRI in 2018 was unremarkable and did not show any masses or irregularities. Will check a prolactin level today. Since she has not shown any evidence of a pituitary mass and her prolactin has normalized off the medication, I don't feel she needs to continue to follow with me at this time. Pt voices understanding and agreement with the plan. Follow-up and Dispositions    · Return in about 3 months (around 2/8/2020).          Copy sent to:  Dr. Fidelia Viera

## 2019-11-09 LAB — PROLACTIN SERPL-MCNC: 31.2 NG/ML (ref 4.8–23.3)

## 2019-11-11 NOTE — PROGRESS NOTES
I called and spoke to pt regarding her labs. Pt to stay off the cabergoline. Pt voices understanding and agreement with the plan.

## 2019-12-04 ENCOUNTER — OFFICE VISIT (OUTPATIENT)
Dept: SURGERY | Age: 59
End: 2019-12-04

## 2019-12-04 ENCOUNTER — DOCUMENTATION ONLY (OUTPATIENT)
Dept: SURGERY | Age: 59
End: 2019-12-04

## 2019-12-04 VITALS
HEIGHT: 65 IN | DIASTOLIC BLOOD PRESSURE: 72 MMHG | WEIGHT: 226 LBS | BODY MASS INDEX: 37.65 KG/M2 | HEART RATE: 84 BPM | SYSTOLIC BLOOD PRESSURE: 154 MMHG

## 2019-12-04 DIAGNOSIS — C50.311 MALIGNANT NEOPLASM OF LOWER-INNER QUADRANT OF RIGHT BREAST OF FEMALE, ESTROGEN RECEPTOR POSITIVE (HCC): Primary | ICD-10-CM

## 2019-12-04 DIAGNOSIS — Z17.0 MALIGNANT NEOPLASM OF LOWER-INNER QUADRANT OF RIGHT BREAST OF FEMALE, ESTROGEN RECEPTOR POSITIVE (HCC): Primary | ICD-10-CM

## 2019-12-04 RX ORDER — IRBESARTAN 150 MG/1
150 TABLET ORAL DAILY
COMMUNITY

## 2019-12-04 RX ORDER — ATORVASTATIN CALCIUM 20 MG/1
TABLET, FILM COATED ORAL
Refills: 3 | COMMUNITY
Start: 2019-11-18

## 2019-12-04 NOTE — LETTER
12/6/2019 12:48 PM 
 
Patient: Sebastian Modi YOB: 1960 Date of Visit: 12/4/2019 Dear Ezequiel Perry MD 
[address] [place] [address] [address] [place] [address] VIA Facsimile: [phone] 
 : Thank you for referring Ms. Oanh Lucas to me for evaluation/treatment. Below are the relevant portions of my assessment and plan of care. HISTORY OF PRESENT ILLNESS Sebastian Modi is a 64 y.o. female. HPI NEW patient consult referred by Benji Bailey, Oncology Coordinator for newly diagnosed RIGHT breast cancer. This was detected on screening mammogram, which led to diagnostic studies and a biopsy, but patient states she has had LEFT nipple drainage in the past and RIGHT breast infection but that there was no palpable lump or specific problem prior to this screening mammogram in October. OB History Obstetric Comments Menarche 17, LMP age 38, # of children 2, age of 4st delivery 23, Hysterectomy/oophorectomy no/no, Breast bx no, history of breast feeding no, BCP yes, Hormone therapy no Family History: No family history of breast or ovarian cancer. Imaging at [facility]: 
Mammogram, 10/28/19, BILATERAL screening, BIRADS 0 Mammogram, RIGHT dx and RIGHT breast U/S, 11/1/19, BIRADS 4 Past Medical History:  
Diagnosis Date  Cancer ([address] 11/2019 RIGHT breast  
 Diabetes ([address]  Hypercholesterolemia  Hypertension Past Surgical History:  
Procedure Laterality Date  HX GYN    
 D&C  
 HX GYN    
 vaginal deliveries  HX GYN    
 BTL  
 NEUROLOGICAL PROCEDURE UNLISTED Bilateral   
 CTR Social History Socioeconomic History  Marital status: UNKNOWN Spouse name: Not on file  Number of children: Not on file  Years of education: Not on file  Highest education level: Not on file Occupational History  Not on file Social Needs  Financial resource strain: Not on file  Food insecurity: Worry: Not on file Inability: Not on file  Transportation needs:  
  Medical: Not on file Non-medical: Not on file Tobacco Use  Smoking status: Never Smoker  Smokeless tobacco: Never Used Substance and Sexual Activity  Alcohol use: No  
 Drug use: No  
 Sexual activity: Not on file Lifestyle  Physical activity:  
  Days per week: Not on file Minutes per session: Not on file  Stress: Not on file Relationships  Social connections:  
  Talks on phone: Not on file Gets together: Not on file Attends Ep[employer]op[employer] service: Not on file Active member of club or organization: Not on file Attends meetings of clubs or organizations: Not on file Relationship status: Not on file  Intimate partner violence:  
  Fear of current or ex partner: Not on file Emotionally abused: Not on file Physically abused: Not on file Forced sexual activity: Not on file Other Topics Concern  Not on file Social History Narrative  Not on file OB History No obstetric history on file. Obstetric Comments Menarche 17, LMP age 38, # of children 2, age of 4st delivery 23, Hysterectomy/oophorectomy no/no, Breast bx no, history of breast feeding no, BCP yes, Hormone therapy no Current Outpatient Medications:  
  atorvastatin (LIPITOR) 20 mg tablet, TAKE 1 TABLET BY MOUTH ONCE DAILY, Disp: , Rfl: 3 
  irbesartan (AVAPRO) 150 mg tablet, Take 150 mg by mouth nightly., Disp: , Rfl:  
  metFORMIN ER (GLUCOPHAGE XR) 500 mg tablet, Take 500 mg by mouth daily. , Disp: , Rfl:  
  amLODIPine (NORVASC) 10 mg tablet, daily. , Disp: , Rfl:  
  irbesartan (AVAPRO) 75 mg tablet, TAKE 1 TABLET BY MOUTH AT BEDTIME, Disp: , Rfl: 1 
  meloxicam (MOBIC) 15 mg tablet, Take 15 mg by mouth as needed for Pain., Disp: , Rfl:  
No Known Allergies Review of Systems All other systems reviewed and are negative.  
 
 
Physical Exam 
 Vitals signs and nursing note reviewed. Constitutional:   
   Appearance: She is well-developed. HENT:  
   Head: Normocephalic. Neck: Musculoskeletal: Neck supple. Thyroid: No thyromegaly. Cardiovascular:  
   Rate and Rhythm: Normal rate and regular rhythm. Heart sounds: Normal heart sounds. Pulmonary:  
   Effort: Pulmonary effort is normal.  
   Breath sounds: Normal breath sounds. Chest:  
   Breasts: Breasts are symmetrical.  
      Right: No inverted nipple, mass, nipple discharge, skin change or tenderness. Left: No inverted nipple, mass, nipple discharge, skin change or tenderness. Abdominal:  
   Palpations: Abdomen is soft. Musculoskeletal: Normal range of motion. Lymphadenopathy:  
   Cervical: No cervical adenopathy. Skin: 
   General: Skin is warm and dry. Neurological:  
   Mental Status: She is alert and oriented to person, place, and time. BREAST ULTRASOUND, Preop planning Indication:preop planning  right Breast 4:00 6 cfn Technique: The area was scanned using a high-frequency linear-array near-field transducer Findings: 7 mm irregular mass with dropout Impression: Biopsy site visible with ultrasound Disposition:  Will schedule breast mri ASSESSMENT and PLAN 
  ICD-10-CM ICD-9-CM 1. Malignant neoplasm of lower-inner quadrant of right breast of female, estrogen receptor positive ([facility] C50.311 174.3 MRI BREAST BI W WO CONT  
 Z17.0 V86.0 BRAC-ANALYSIS  
63 yo female with right breast T1 N0 IDC grade 2 Er+ Pr + Her 2 barbara negative. Questionable second lesion, same quadrant She is here with family. I have reviewed the imaging and pathology with her and she was given copies of these reports. 90 minutes were spent face-to-face with the patient during this encounter and 90% of that time was spent on counseling and coordination of care. 1. Discussed lumpectomy and radiation vs mastectomy. Discussed reconstruction. MRI ordered to see if patient is a candidate for a lumpectomy. 2. Discussed sentinel lymph node biopsy. 3. Discussed external beam radiation. 4. Discussed hormone therapy. 5. Discussed the possibility of chemotherapy. Will schedule mri and plan for lumpectomy, sln biopsy. There is questionable second lesion on mammo in same quadrant. Will assess with mri. They were happy with plan. If you have questions, please do not hesitate to call me. I look forward to following Ms. Margaret Maier along with you. Sincerely, Linette Manzo MD

## 2019-12-04 NOTE — PROGRESS NOTES
Type of Film: [x] CD [] FILMS  Type of Test: [] MRI [x] MAMMO  From: On license of [facility]  Given to: Obdulia Baldwin RN, bringing to [employer] 12/5/19 to be uploaded into pacs.   LOCATION  To be Downloaded into PACS:  YES

## 2019-12-04 NOTE — PATIENT INSTRUCTIONS
Breast Cancer: Care Instructions  Your Care Instructions    Breast cancer occurs when abnormal cells grow out of control in the breast. These cancer cells can spread within the breast, to nearby lymph nodes and other tissues, and to other parts of the body. Being treated for cancer can weaken your body, and you may feel very tired. Get the rest your body needs so you can feel better. Finding out that you have cancer is scary. You may feel many emotions and may need some help coping. Seek out family, friends, and counselors for support. You also can do things at home to make yourself feel better while you go through treatment. Call the [employer] (8-582.136.1198) or visit its website at [employer] [facility] for more information. Follow-up care is a key part of your treatment and safety. Be sure to make and go to all appointments, and call your doctor if you are having problems. It's also a good idea to know your test results and keep a list of the medicines you take. How can you care for yourself at home? · Take your medicines exactly as prescribed. Call your doctor if you think you are having a problem with your medicine. You may get medicine for nausea and vomiting if you have these side effects. · Follow your doctor's instructions to relieve pain. Pain from cancer and surgery can almost always be controlled. Use pain medicine when you first notice pain, before it becomes severe. · Eat healthy food. If you do not feel like eating, try to eat food that has protein and extra calories to keep up your strength and prevent weight loss. Drink liquid meal replacements for extra calories and protein. Try to eat your main meal early. · Get some physical activity every day, but do not get too tired. Keep doing the hobbies you enjoy as your energy allows. · Do not smoke. Smoking can make your cancer worse. If you need help quitting, talk to your doctor about stop-smoking programs and medicines.  These can increase your chances of quitting for good. · Take steps to control your stress and workload. Learn relaxation techniques. ? Share your feelings. Stress and tension affect our emotions. By expressing your feelings to others, you may be able to understand and cope with them. ? Consider joining a support group. Talking about a problem with your spouse, a good friend, or other people with similar problems is a good way to reduce tension and stress. ? Express yourself through art. Try writing, crafts, dance, or art to relieve stress. Some dance, writing, or art groups may be available just for people who have cancer. ? Be kind to your body and mind. Getting enough sleep, eating a healthy diet, and taking time to do things you enjoy can contribute to an overall feeling of balance in your life and can help reduce stress. ? Get help if you need it. Discuss your concerns with your doctor or counselor. · If you are vomiting or have diarrhea:  ? Drink plenty of fluids (enough so that your urine is light yellow or clear like water) to prevent dehydration. Choose water and other caffeine-free clear liquids. If you have kidney, heart, or liver disease and have to limit fluids, talk with your doctor before you increase the amount of fluids you drink. ? When you are able to eat, try clear soups, mild foods, and liquids until all symptoms are gone for 12 to 48 hours. Other good choices include dry toast, crackers, cooked cereal, and gelatin dessert, such as Jell-O.  · If you have not already done so, prepare a list of advance directives. Advance directives are instructions to your doctor and family members about what kind of care you want if you become unable to speak or express yourself. When should you call for help? Call 911 anytime you think you may need emergency care.  For example, call if:    · You passed out (lost consciousness).    Call your doctor now or seek immediate medical care if:    · You have a fever.     · You have abnormal bleeding.     · You think you have an infection.     · You have new or worse pain.     · You have new symptoms, such as a cough, belly pain, vomiting, diarrhea, or a rash.    Watch closely for changes in your health, and be sure to contact your doctor if:    · You are much more tired than usual.     · You have swollen glands in your armpits, groin, or neck.     · You do not get better as expected. Where can you learn more? Go to http://ilya-yolande.info/. Enter V321 in the search box to learn more about \"Breast Cancer: Care Instructions. \"  Current as of: December 19, 2018  Content Version: 12.2  © 2842-6588 [employer]. Care instructions adapted under license by [employer] (which disclaims liability or warranty for this information). If you have questions about a medical condition or this instruction, always ask your healthcare professional. [place] [address] any warranty or liability for your use of this information. Breast Cancer: Care Instructions  Your Care Instructions    Breast cancer occurs when abnormal cells grow out of control in the breast. These cancer cells can spread within the breast, to nearby lymph nodes and other tissues, and to other parts of the body. Being treated for cancer can weaken your body, and you may feel very tired. Get the rest your body needs so you can feel better. Finding out that you have cancer is scary. You may feel many emotions and may need some help coping. Seek out family, friends, and counselors for support. You also can do things at home to make yourself feel better while you go through treatment. Call the [employer] (1-890.466.2343) or visit its website at [address] [employer] for more information. Follow-up care is a key part of your treatment and safety. Be sure to make and go to all appointments, and call your doctor if you are having problems.  It's also a good idea to know your test results and keep a list of the medicines you take. How can you care for yourself at home? · Take your medicines exactly as prescribed. Call your doctor if you think you are having a problem with your medicine. You may get medicine for nausea and vomiting if you have these side effects. · Follow your doctor's instructions to relieve pain. Pain from cancer and surgery can almost always be controlled. Use pain medicine when you first notice pain, before it becomes severe. · Eat healthy food. If you do not feel like eating, try to eat food that has protein and extra calories to keep up your strength and prevent weight loss. Drink liquid meal replacements for extra calories and protein. Try to eat your main meal early. · Get some physical activity every day, but do not get too tired. Keep doing the hobbies you enjoy as your energy allows. · Do not smoke. Smoking can make your cancer worse. If you need help quitting, talk to your doctor about stop-smoking programs and medicines. These can increase your chances of quitting for good. · Take steps to control your stress and workload. Learn relaxation techniques. ? Share your feelings. Stress and tension affect our emotions. By expressing your feelings to others, you may be able to understand and cope with them. ? Consider joining a support group. Talking about a problem with your spouse, a good friend, or other people with similar problems is a good way to reduce tension and stress. ? Express yourself through art. Try writing, crafts, dance, or art to relieve stress. Some dance, writing, or art groups may be available just for people who have cancer. ? Be kind to your body and mind. Getting enough sleep, eating a healthy diet, and taking time to do things you enjoy can contribute to an overall feeling of balance in your life and can help reduce stress. ? Get help if you need it. Discuss your concerns with your doctor or counselor.   · If you are vomiting or have diarrhea:  ? Drink plenty of fluids (enough so that your urine is light yellow or clear like water) to prevent dehydration. Choose water and other caffeine-free clear liquids. If you have kidney, heart, or liver disease and have to limit fluids, talk with your doctor before you increase the amount of fluids you drink. ? When you are able to eat, try clear soups, mild foods, and liquids until all symptoms are gone for 12 to 48 hours. Other good choices include dry toast, crackers, cooked cereal, and gelatin dessert, such as Jell-O.  · If you have not already done so, prepare a list of advance directives. Advance directives are instructions to your doctor and family members about what kind of care you want if you become unable to speak or express yourself. When should you call for help? Call 911 anytime you think you may need emergency care. For example, call if:    · You passed out (lost consciousness).    Call your doctor now or seek immediate medical care if:    · You have a fever.     · You have abnormal bleeding.     · You think you have an infection.     · You have new or worse pain.     · You have new symptoms, such as a cough, belly pain, vomiting, diarrhea, or a rash.    Watch closely for changes in your health, and be sure to contact your doctor if:    · You are much more tired than usual.     · You have swollen glands in your armpits, groin, or neck.     · You do not get better as expected. Where can you learn more? Go to http://ilya-yolande.info/. Enter V321 in the search box to learn more about \"Breast Cancer: Care Instructions. \"  Current as of: December 19, 2018  Content Version: 12.2  © 5723-2296 [employer]. Care instructions adapted under license by [employer] (which disclaims liability or warranty for this information).  If you have questions about a medical condition or this instruction, always ask your healthcare professional. [employer] disclaims any warranty or liability for your use of this information.

## 2019-12-04 NOTE — PROGRESS NOTES
HISTORY OF PRESENT ILLNESS  Jessica Banks is a 64 y.o. female. HPI NEW patient consult referred by Dianelys Head, Oncology Coordinator for newly diagnosed RIGHT breast cancer. This was detected on screening mammogram, which led to diagnostic studies and a biopsy, but patient states she has had LEFT nipple drainage in the past and RIGHT breast infection but that there was no palpable lump or specific problem prior to this screening mammogram in October. OB History   Obstetric Comments   Menarche 17, LMP age 38, # of children 2, age of 4st delivery 23, Hysterectomy/oophorectomy no/no, Breast bx no, history of breast feeding no, BCP yes, Hormone therapy no       Family History:  No family history of breast or ovarian cancer.       Imaging at [facility]:  Mammogram, 10/28/19, BILATERAL screening, BIRADS 0  Mammogram, RIGHT dx and RIGHT breast U/S, 11/1/19, BIRADS 4     Past Medical History:   Diagnosis Date    Cancer ([facility] 11/2019    RIGHT breast    Diabetes ([facility]     Hypercholesterolemia     Hypertension      Past Surgical History:   Procedure Laterality Date    HX GYN      D&C    HX GYN      vaginal deliveries    HX GYN      BTL    NEUROLOGICAL PROCEDURE UNLISTED Bilateral     CTR     Social History     Socioeconomic History    Marital status: UNKNOWN     Spouse name: Not on file    Number of children: Not on file    Years of education: Not on file    Highest education level: Not on file   Occupational History    Not on file   Social Needs    Financial resource strain: Not on file    Food insecurity:     Worry: Not on file     Inability: Not on file    Transportation needs:     Medical: Not on file     Non-medical: Not on file   Tobacco Use    Smoking status: Never Smoker    Smokeless tobacco: Never Used   Substance and Sexual Activity    Alcohol use: No    Drug use: No    Sexual activity: Not on file   Lifestyle    Physical activity:     Days per week: Not on file Minutes per session: Not on file    Stress: Not on file   Relationships    Social connections:     Talks on phone: Not on file     Gets together: Not on file     Attends Rastafarian service: Not on file     Active member of club or organization: Not on file     Attends meetings of clubs or organizations: Not on file     Relationship status: Not on file    Intimate partner violence:     Fear of current or ex partner: Not on file     Emotionally abused: Not on file     Physically abused: Not on file     Forced sexual activity: Not on file   Other Topics Concern    Not on file   Social History Narrative    Not on file     OB History    No obstetric history on file. Obstetric Comments   Menarche 17, LMP age 38, # of children 2, age of 4st delivery 23, Hysterectomy/oophorectomy no/no, Breast bx no, history of breast feeding no, BCP yes, Hormone therapy no             Current Outpatient Medications:     atorvastatin (LIPITOR) 20 mg tablet, TAKE 1 TABLET BY MOUTH ONCE DAILY, Disp: , Rfl: 3    irbesartan (AVAPRO) 150 mg tablet, Take 150 mg by mouth nightly., Disp: , Rfl:     metFORMIN ER (GLUCOPHAGE XR) 500 mg tablet, Take 500 mg by mouth daily. , Disp: , Rfl:     amLODIPine (NORVASC) 10 mg tablet, daily. , Disp: , Rfl:     irbesartan (AVAPRO) 75 mg tablet, TAKE 1 TABLET BY MOUTH AT BEDTIME, Disp: , Rfl: 1    meloxicam (MOBIC) 15 mg tablet, Take 15 mg by mouth as needed for Pain., Disp: , Rfl:   No Known Allergies  Review of Systems   All other systems reviewed and are negative. Physical Exam  Vitals signs and nursing note reviewed. Constitutional:       Appearance: She is well-developed. HENT:      Head: Normocephalic. Neck:      Musculoskeletal: Neck supple. Thyroid: No thyromegaly. Cardiovascular:      Rate and Rhythm: Normal rate and regular rhythm. Heart sounds: Normal heart sounds. Pulmonary:      Effort: Pulmonary effort is normal.      Breath sounds: Normal breath sounds. Chest:      Breasts: Breasts are symmetrical.         Right: No inverted nipple, mass, nipple discharge, skin change or tenderness. Left: No inverted nipple, mass, nipple discharge, skin change or tenderness. Abdominal:      Palpations: Abdomen is soft. Musculoskeletal: Normal range of motion. Lymphadenopathy:      Cervical: No cervical adenopathy. Skin:     General: Skin is warm and dry. Neurological:      Mental Status: She is alert and oriented to person, place, and time. BREAST ULTRASOUND, Preop planning  Indication:preop planning  right Breast 4:00 6 cfn   Technique: The area was scanned using a high-frequency linear-array near-field transducer  Findings: 7 mm irregular mass with dropout  Impression: Biopsy site visible with ultrasound  Disposition:  Will schedule breast mri  ASSESSMENT and PLAN    ICD-10-CM ICD-9-CM    1. Malignant neoplasm of lower-inner quadrant of right breast of female, estrogen receptor positive (H[facility]) C50.311 174.3 MRI BREAST BI W WO CONT    Z17.0 V86.0 BRAC-ANALYSIS   63 yo female with right breast T1 N0   IDC grade 2 Er+ Pr + Her 2 barbara negative. Questionable second lesion, same quadrant  She is here with family. I have reviewed the imaging and pathology with her and she was given copies of these reports. 90 minutes were spent face-to-face with the patient during this encounter and 90% of that time was spent on counseling and coordination of care. 1. Discussed lumpectomy and radiation vs mastectomy. Discussed reconstruction. MRI ordered to see if patient is a candidate for a lumpectomy. 2. Discussed sentinel lymph node biopsy. 3. Discussed external beam radiation. 4. Discussed hormone therapy. 5. Discussed the possibility of chemotherapy. Will schedule mri and plan for lumpectomy, sln biopsy. There is questionable second lesion on mammo in same quadrant. Will assess with mri. They were happy with plan.

## 2019-12-05 ENCOUNTER — DOCUMENTATION ONLY (OUTPATIENT)
Dept: SURGERY | Age: 59
End: 2019-12-05

## 2019-12-05 NOTE — PROGRESS NOTES
Type of Film: [x] CD [] FILMS  Type of Test: [] MRI [x] MAMMO  From: [facility]  Given to: Nidia Judd at [facility] at  [address]  To be Downloaded into PACS:  YES

## 2019-12-06 ENCOUNTER — DOCUMENTATION ONLY (OUTPATIENT)
Dept: SURGERY | Age: 59
End: 2019-12-06

## 2019-12-06 NOTE — COMMUNICATION BODY
HISTORY OF PRESENT ILLNESS  Cecille Live is a 64 y.o. female. HPI NEW patient consult referred by Belkis King, Oncology Coordinator for newly diagnosed RIGHT breast cancer. This was detected on screening mammogram, which led to diagnostic studies and a biopsy, but patient states she has had LEFT nipple drainage in the past and RIGHT breast infection but that there was no palpable lump or specific problem prior to this screening mammogram in October. OB History   Obstetric Comments   Menarche 17, LMP age 38, # of children 2, age of 4st delivery 23, Hysterectomy/oophorectomy no/no, Breast bx no, history of breast feeding no, BCP yes, Hormone therapy no       Family History:  No family history of breast or ovarian cancer.       Imaging at [facility]:  Mammogram, 10/28/19, BILATERAL screening, BIRADS 0  Mammogram, RIGHT dx and RIGHT breast U/S, 11/1/19, BIRADS 4     Past Medical History:   Diagnosis Date    Cancer ([facility] [address] 11/2019    RIGHT breast    Diabetes ([facility] [address]     Hypercholesterolemia     Hypertension      Past Surgical History:   Procedure Laterality Date    HX GYN      D&C    HX GYN      vaginal deliveries    HX GYN      BTL    NEUROLOGICAL PROCEDURE UNLISTED Bilateral     CTR     Social History     Socioeconomic History    Marital status: UNKNOWN     Spouse name: Not on file    Number of children: Not on file    Years of education: Not on file    Highest education level: Not on file   Occupational History    Not on file   Social Needs    Financial resource strain: Not on file    Food insecurity:     Worry: Not on file     Inability: Not on file    Transportation needs:     Medical: Not on file     Non-medical: Not on file   Tobacco Use    Smoking status: Never Smoker    Smokeless tobacco: Never Used   Substance and Sexual Activity    Alcohol use: No    Drug use: No    Sexual activity: Not on file   Lifestyle    Physical activity:     Days per week: Not on file Minutes per session: Not on file    Stress: Not on file   Relationships    Social connections:     Talks on phone: Not on file     Gets together: Not on file     Attends Druze service: Not on file     Active member of club or organization: Not on file     Attends meetings of clubs or organizations: Not on file     Relationship status: Not on file    Intimate partner violence:     Fear of current or ex partner: Not on file     Emotionally abused: Not on file     Physically abused: Not on file     Forced sexual activity: Not on file   Other Topics Concern    Not on file   Social History Narrative    Not on file     OB History    No obstetric history on file. Obstetric Comments   Menarche 17, LMP age 38, # of children 2, age of 4st delivery 23, Hysterectomy/oophorectomy no/no, Breast bx no, history of breast feeding no, BCP yes, Hormone therapy no             Current Outpatient Medications:     atorvastatin (LIPITOR) 20 mg tablet, TAKE 1 TABLET BY MOUTH ONCE DAILY, Disp: , Rfl: 3    irbesartan (AVAPRO) 150 mg tablet, Take 150 mg by mouth nightly., Disp: , Rfl:     metFORMIN ER (GLUCOPHAGE XR) 500 mg tablet, Take 500 mg by mouth daily. , Disp: , Rfl:     amLODIPine (NORVASC) 10 mg tablet, daily. , Disp: , Rfl:     irbesartan (AVAPRO) 75 mg tablet, TAKE 1 TABLET BY MOUTH AT BEDTIME, Disp: , Rfl: 1    meloxicam (MOBIC) 15 mg tablet, Take 15 mg by mouth as needed for Pain., Disp: , Rfl:   No Known Allergies  Review of Systems   All other systems reviewed and are negative. Physical Exam  Vitals signs and nursing note reviewed. Constitutional:       Appearance: She is well-developed. HENT:      Head: Normocephalic. Neck:      Musculoskeletal: Neck supple. Thyroid: No thyromegaly. Cardiovascular:      Rate and Rhythm: Normal rate and regular rhythm. Heart sounds: Normal heart sounds. Pulmonary:      Effort: Pulmonary effort is normal.      Breath sounds: Normal breath sounds. Chest:      Breasts: Breasts are symmetrical.         Right: No inverted nipple, mass, nipple discharge, skin change or tenderness. Left: No inverted nipple, mass, nipple discharge, skin change or tenderness. Abdominal:      Palpations: Abdomen is soft. Musculoskeletal: Normal range of motion. Lymphadenopathy:      Cervical: No cervical adenopathy. Skin:     General: Skin is warm and dry. Neurological:      Mental Status: She is alert and oriented to person, place, and time. BREAST ULTRASOUND, Preop planning  Indication:preop planning  right Breast 4:00 6 cfn   Technique: The area was scanned using a high-frequency linear-array near-field transducer  Findings: 7 mm irregular mass with dropout  Impression: Biopsy site visible with ultrasound  Disposition:  Will schedule breast mri  ASSESSMENT and PLAN    ICD-10-CM ICD-9-CM    1. Malignant neoplasm of lower-inner quadrant of right breast of female, estrogen receptor positive (HCC) C50.311 174.3 MRI BREAST BI W WO CONT    Z17.0 V86.0 BRAC-ANALYSIS   63 yo female with right breast T1 N0   IDC grade 2 Er+ Pr + Her 2 barbara negative. Questionable second lesion, same quadrant  She is here with family. I have reviewed the imaging and pathology with her and she was given copies of these reports. 90 minutes were spent face-to-face with the patient during this encounter and 90% of that time was spent on counseling and coordination of care. 1. Discussed lumpectomy and radiation vs mastectomy. Discussed reconstruction. MRI ordered to see if patient is a candidate for a lumpectomy. 2. Discussed sentinel lymph node biopsy. 3. Discussed external beam radiation. 4. Discussed hormone therapy. 5. Discussed the possibility of chemotherapy. Will schedule mri and plan for lumpectomy, sln biopsy. There is questionable second lesion on mammo in same quadrant. Will assess with mri. They were happy with plan.

## 2019-12-12 ENCOUNTER — HOSPITAL ENCOUNTER (OUTPATIENT)
Dept: MRI IMAGING | Age: 59
Discharge: HOME | End: 2019-12-12
Attending: SURGERY
Payer: COMMERCIAL

## 2019-12-12 DIAGNOSIS — C50.311 MALIGNANT NEOPLASM OF LOWER-INNER QUADRANT OF RIGHT BREAST OF FEMALE, ESTROGEN RECEPTOR POSITIVE (HCC): ICD-10-CM

## 2019-12-12 DIAGNOSIS — Z17.0 MALIGNANT NEOPLASM OF LOWER-INNER QUADRANT OF RIGHT BREAST OF FEMALE, ESTROGEN RECEPTOR POSITIVE (HCC): ICD-10-CM

## 2019-12-12 LAB — CREAT BLD-MCNC: 0.7 MG/DL (ref 0.6–1.3)

## 2019-12-12 PROCEDURE — 74011250636 HC RX REV CODE- 250/636: Performed by: SURGERY

## 2019-12-12 PROCEDURE — A9585 GADOBUTROL INJECTION: HCPCS | Performed by: SURGERY

## 2019-12-12 PROCEDURE — 77049 MRI BREAST C-+ W/CAD BI: CPT

## 2019-12-12 PROCEDURE — 82565 ASSAY OF CREATININE: CPT

## 2019-12-12 RX ADMIN — GADOBUTROL 10 ML: 604.72 INJECTION INTRAVENOUS at 08:27

## 2019-12-13 ENCOUNTER — TELEPHONE (OUTPATIENT)
Dept: SURGERY | Age: 59
End: 2019-12-13

## 2019-12-13 DIAGNOSIS — N63.10 BREAST MASS, RIGHT: Primary | ICD-10-CM

## 2019-12-13 NOTE — TELEPHONE ENCOUNTER
Right breast ultrasound and biopsies      Called with mri results. Additional lesion in same quadrant  Additional enhancement different quadrant  Needs biopsies  Spoke with delroy   Recommends second look us and biopsies. Will schedule  Will go ahead refer to plastics to discuss reduction lumpectomy vs reconstruction.

## 2019-12-16 ENCOUNTER — TELEPHONE (OUTPATIENT)
Dept: SURGERY | Age: 59
End: 2019-12-16

## 2019-12-16 DIAGNOSIS — C50.311 MALIGNANT NEOPLASM OF LOWER-INNER QUADRANT OF RIGHT BREAST OF FEMALE, ESTROGEN RECEPTOR POSITIVE (HCC): Primary | ICD-10-CM

## 2019-12-16 DIAGNOSIS — Z17.0 MALIGNANT NEOPLASM OF LOWER-INNER QUADRANT OF RIGHT BREAST OF FEMALE, ESTROGEN RECEPTOR POSITIVE (HCC): Primary | ICD-10-CM

## 2019-12-16 NOTE — TELEPHONE ENCOUNTER
Called patient with result of genetic test which is negative. She is happy to hear this. Confirmed her mailing address so I can send her a copy. She is meeting with plastic surgeon tomorrow. Has 2nd look US/possible bx right breast on Thurs 12/19.

## 2019-12-19 ENCOUNTER — HOSPITAL ENCOUNTER (OUTPATIENT)
Dept: MAMMOGRAPHY | Age: 59
Discharge: HOME | End: 2019-12-19
Attending: SURGERY
Payer: COMMERCIAL

## 2019-12-19 ENCOUNTER — HOSPITAL ENCOUNTER (OUTPATIENT)
Dept: MAMMOGRAPHY | Age: 59
Discharge: HOME | End: 2019-12-19
Payer: COMMERCIAL

## 2019-12-19 DIAGNOSIS — N63.10 BREAST MASS, RIGHT: ICD-10-CM

## 2019-12-19 DIAGNOSIS — R92.1 BREAST CALCIFICATIONS: ICD-10-CM

## 2019-12-19 DIAGNOSIS — N63.0 BREAST MASS: ICD-10-CM

## 2019-12-19 DIAGNOSIS — R92.8 ABNORMAL MAMMOGRAM: ICD-10-CM

## 2019-12-19 PROCEDURE — 76642 ULTRASOUND BREAST LIMITED: CPT

## 2019-12-19 PROCEDURE — 19082 BX BREAST ADD LESION STRTCTC: CPT

## 2019-12-19 PROCEDURE — 74011000250 HC RX REV CODE- 250: Performed by: RADIOLOGY

## 2019-12-19 PROCEDURE — 88360 TUMOR IMMUNOHISTOCHEM/MANUAL: CPT

## 2019-12-19 PROCEDURE — 19081 BX BREAST 1ST LESION STRTCTC: CPT

## 2019-12-19 PROCEDURE — 88305 TISSUE EXAM BY PATHOLOGIST: CPT

## 2019-12-19 PROCEDURE — 77065 DX MAMMO INCL CAD UNI: CPT

## 2019-12-19 RX ORDER — LIDOCAINE HYDROCHLORIDE 10 MG/ML
15 INJECTION INFILTRATION; PERINEURAL
Status: COMPLETED | OUTPATIENT
Start: 2019-12-19 | End: 2019-12-19

## 2019-12-19 RX ORDER — LIDOCAINE HYDROCHLORIDE AND EPINEPHRINE 10; 10 MG/ML; UG/ML
10 INJECTION, SOLUTION INFILTRATION; PERINEURAL ONCE
Status: COMPLETED | OUTPATIENT
Start: 2019-12-19 | End: 2019-12-19

## 2019-12-19 RX ADMIN — LIDOCAINE HYDROCHLORIDE AND EPINEPHRINE 100 MG: 10; 10 INJECTION, SOLUTION INFILTRATION; PERINEURAL at 11:40

## 2019-12-19 RX ADMIN — LIDOCAINE HYDROCHLORIDE AND EPINEPHRINE 100 MG: 10; 10 INJECTION, SOLUTION INFILTRATION; PERINEURAL at 12:00

## 2019-12-19 RX ADMIN — LIDOCAINE HYDROCHLORIDE 15 ML: 10 INJECTION, SOLUTION INFILTRATION; PERINEURAL at 12:00

## 2019-12-19 RX ADMIN — LIDOCAINE HYDROCHLORIDE 15 ML: 10 INJECTION, SOLUTION INFILTRATION; PERINEURAL at 11:40

## 2019-12-19 NOTE — PROGRESS NOTES
Patient tolerated right breast biopsy x 2 sites well with minimal bleeding. Biopsy sites were bandaged in the usual fashion and discharge instructions were reviewed with the patient. She was provided with a written copy as well. Advised patient that results should be available by Monday and that she will receive a phone call in the afternoon. Encouraged her to call with any questions or concerns.

## 2019-12-23 NOTE — PROGRESS NOTES
Dr. Ricco Villareal called patient with pathology results. I spoke with patient, she states that she does not have an appointment scheduled with Dr. Akosua Geiger for follow up but expects a phone call next week when Dr. Akosua Geiger returns from vacation. The patient reports that her biopsy sites are healing well and she has no concerns about them. Encouraged her to call with any questions or concerns.

## 2020-01-03 DIAGNOSIS — Z17.0 MALIGNANT NEOPLASM OF LOWER-INNER QUADRANT OF RIGHT BREAST OF FEMALE, ESTROGEN RECEPTOR POSITIVE (HCC): Primary | ICD-10-CM

## 2020-01-03 DIAGNOSIS — C50.311 MALIGNANT NEOPLASM OF LOWER-INNER QUADRANT OF RIGHT BREAST OF FEMALE, ESTROGEN RECEPTOR POSITIVE (HCC): Primary | ICD-10-CM

## 2020-01-20 ENCOUNTER — HOSPITAL ENCOUNTER (OUTPATIENT)
Dept: PREADMISSION TESTING | Age: 60
Discharge: HOME | End: 2020-01-20
Attending: SURGERY
Payer: COMMERCIAL

## 2020-01-20 VITALS
WEIGHT: 232.37 LBS | HEART RATE: 81 BPM | TEMPERATURE: 98.2 F | OXYGEN SATURATION: 97 % | HEIGHT: 65 IN | BODY MASS INDEX: 38.71 KG/M2 | DIASTOLIC BLOOD PRESSURE: 71 MMHG | SYSTOLIC BLOOD PRESSURE: 151 MMHG

## 2020-01-20 PROCEDURE — 93005 ELECTROCARDIOGRAM TRACING: CPT

## 2020-01-20 NOTE — PERIOP NOTES
[facility]  Preoperative Instructions        Surgery Date 01/29/20       Time Deann Guillen  Contact # [phone]    1. On the day of your surgery, please report to the Surgical Services Registration Desk and sign in at your designated time. The Surgery Center is located to the right of the Emergency Room. 2. You must have someone with you to drive you home. You should not drive a car for 24 hours following surgery. Please make arrangements for a friend or family member to stay with you for the first 24 hours after your surgery. 3. Do not have anything to eat or drink (including water, gum, mints, coffee, juice) after midnight ?1/28/20? Hany Hopkins ? This may not apply to medications prescribed by your physician. ?(Please note below the special instructions with medications to take the morning of your procedure.)    4. We recommend you do not drink any alcoholic beverages for 24 hours before and after your surgery. 5. Contact your surgeons office for instructions on the following medications: non-steroidal anti-inflammatory drugs (i.e. Advil, Aleve), vitamins, and supplements. (Some surgeons will want you to stop these medications prior to surgery and others may allow you to take them)  **If you are currently taking Plavix, Coumadin, Aspirin and/or other blood-thinning agents, contact your surgeon for instructions. ** Your surgeon will partner with the physician prescribing these medications to determine if it is safe to stop or if you need to continue taking. Please do not stop taking these medications without instructions from your surgeon    6. Wear comfortable clothes. Wear glasses instead of contacts. Do not bring any money or jewelry. Please bring picture ID, insurance card, and any prearranged co-payment or hospital payment. Do not wear make-up, particularly mascara the morning of your surgery. Do not wear nail polish, particularly if you are having foot /hand surgery. Wear your hair loose or down, no ponytails, buns, paul pins or clips. All body piercings must be removed. Please shower with antibacterial soap for three consecutive days before and on the morning of surgery, but do not apply any lotions, powders or deodorants after the shower on the day of surgery. Please use a fresh towels after each shower. Please sleep in clean clothes and change bed linens the night before surgery. Please do not shave for 48 hours prior to surgery. Shaving of the face is acceptable. 7. You should understand that if you do not follow these instructions your surgery may be cancelled. If your physical condition changes (I.e. fever, cold or flu) please contact your surgeon as soon as possible. 8. It is important that you be on time. If a situation occurs where you may be late, please call [phone] (OR Holding Area). 9. If you have any questions and or problems, please call [phone] (Pre-admission Testing). 10. Your surgery time may be subject to change. You will receive a phone call the evening prior if your time changes. 11.  If having outpatient surgery, you must have someone to drive you here, stay with you during the duration of your stay, and to drive you home at time of discharge. 12.   In an effort to improve the efficiency, privacy, and safety for all of our Pre-op patients visitors are not allowed in the Holding area. Once you arrive and are registered your family/visitors will be asked to remain in the waiting room. The Pre-op staff will get you from the Surgical Waiting Area and will explain to you and your family/visitors that the Pre-op phase is beginning. The staff will answer any questions and provide instructions for tracking of the patient, by use of the existing tracking number and color-coded status board in the waiting room.   At this time the staff will also ask for your designated spokesperson information in the event that the physician or staff need to provide an update or obtain any pertinent information. The designated spokesperson will be notified if the physician needs to speak to family during the pre-operative phase. If at any time your family/visitors has questions or concerns they may approach the volunteer desk in the waiting area for assistance. Special Instructions:follow surgeon instructions    TAKE ALL MEDICATIONS DAY OF SURGERY EXCEPT: Irbesartan and Metformin      I understand a pre-operative phone call will be made to verify my surgery time. In the event that I am not available, I give permission for a message to be left on my answering service and/or with another person?  Yes       ___________________      __________   _________    (Signature of Patient)             (Witness)                (Date and Time)

## 2020-01-21 ENCOUNTER — DOCUMENTATION ONLY (OUTPATIENT)
Dept: SURGERY | Age: 60
End: 2020-01-21

## 2020-01-21 DIAGNOSIS — D05.11 DUCTAL CARCINOMA IN SITU OF RIGHT BREAST: Primary | ICD-10-CM

## 2020-01-21 NOTE — PROGRESS NOTES
DR. Haven Kaminski ON 2-6-2020 AT 10:30 AM    [phone]  [facility]  [address]  [facility] OF [address] [place] [address]; [facility]    PATIENT WAS SENT A MESSAGE THROUGH [address]

## 2020-01-22 LAB
ATRIAL RATE: 81 BPM
CALCULATED P AXIS, ECG09: 59 DEGREES
CALCULATED R AXIS, ECG10: 1 DEGREES
CALCULATED T AXIS, ECG11: 50 DEGREES
DIAGNOSIS, 93000: NORMAL
P-R INTERVAL, ECG05: 170 MS
Q-T INTERVAL, ECG07: 406 MS
QRS DURATION, ECG06: 76 MS
QTC CALCULATION (BEZET), ECG08: 471 MS
VENTRICULAR RATE, ECG03: 81 BPM

## 2020-01-28 ENCOUNTER — HOSPITAL ENCOUNTER (OUTPATIENT)
Dept: NUCLEAR MEDICINE | Age: 60
Discharge: HOME | End: 2020-01-28
Attending: SURGERY
Payer: COMMERCIAL

## 2020-01-28 DIAGNOSIS — C50.311 MALIGNANT NEOPLASM OF LOWER-INNER QUADRANT OF RIGHT FEMALE BREAST (HCC): ICD-10-CM

## 2020-01-28 DIAGNOSIS — Z17.0 ESTROGEN RECEPTOR POSITIVE: ICD-10-CM

## 2020-01-28 PROCEDURE — 78195 LYMPH SYSTEM IMAGING: CPT

## 2020-01-29 ENCOUNTER — ANESTHESIA (OUTPATIENT)
Dept: SURGERY | Age: 60
End: 2020-01-29
Payer: COMMERCIAL

## 2020-01-29 ENCOUNTER — ANESTHESIA EVENT (OUTPATIENT)
Dept: SURGERY | Age: 60
End: 2020-01-29
Payer: COMMERCIAL

## 2020-01-29 ENCOUNTER — HOSPITAL ENCOUNTER (OUTPATIENT)
Age: 60
Discharge: HOME | End: 2020-01-30
Attending: SURGERY | Admitting: PLASTIC SURGERY
Payer: COMMERCIAL

## 2020-01-29 DIAGNOSIS — Z17.0 MALIGNANT NEOPLASM OF LOWER-INNER QUADRANT OF RIGHT BREAST OF FEMALE, ESTROGEN RECEPTOR POSITIVE (HCC): ICD-10-CM

## 2020-01-29 DIAGNOSIS — C50.311 MALIGNANT NEOPLASM OF LOWER-INNER QUADRANT OF RIGHT BREAST OF FEMALE, ESTROGEN RECEPTOR POSITIVE (HCC): ICD-10-CM

## 2020-01-29 PROBLEM — C50.919 BREAST CANCER IN FEMALE (HCC): Status: ACTIVE | Noted: 2020-01-29

## 2020-01-29 LAB
GLUCOSE BLD STRIP.AUTO-MCNC: 102 MG/DL (ref 65–100)
GLUCOSE BLD STRIP.AUTO-MCNC: 112 MG/DL (ref 65–100)
SERVICE CMNT-IMP: ABNORMAL
SERVICE CMNT-IMP: ABNORMAL

## 2020-01-29 PROCEDURE — 74011000250 HC RX REV CODE- 250: Performed by: SURGERY

## 2020-01-29 PROCEDURE — 74011250636 HC RX REV CODE- 250/636: Performed by: ANESTHESIOLOGY

## 2020-01-29 PROCEDURE — 74011250636 HC RX REV CODE- 250/636: Performed by: SURGERY

## 2020-01-29 PROCEDURE — 74011000272 HC RX REV CODE- 272: Performed by: PLASTIC SURGERY

## 2020-01-29 PROCEDURE — 77030011267 HC ELECTRD BLD COVD -A: Performed by: SURGERY

## 2020-01-29 PROCEDURE — 88307 TISSUE EXAM BY PATHOLOGIST: CPT

## 2020-01-29 PROCEDURE — 76210000006 HC OR PH I REC 0.5 TO 1 HR: Performed by: SURGERY

## 2020-01-29 PROCEDURE — 74011250637 HC RX REV CODE- 250/637: Performed by: SURGERY

## 2020-01-29 PROCEDURE — 82962 GLUCOSE BLOOD TEST: CPT

## 2020-01-29 PROCEDURE — 74011000250 HC RX REV CODE- 250: Performed by: PLASTIC SURGERY

## 2020-01-29 PROCEDURE — 99218 HC RM OBSERVATION: CPT

## 2020-01-29 PROCEDURE — 77030020263 HC SOL INJ SOD CL0.9% LFCR 1000ML: Performed by: SURGERY

## 2020-01-29 PROCEDURE — 77030040361 HC SLV COMPR DVT MDII -B: Performed by: SURGERY

## 2020-01-29 PROCEDURE — 77030002996 HC SUT SLK J&J -A: Performed by: SURGERY

## 2020-01-29 PROCEDURE — 77030010512 HC APPL CLP LIG J&J -C: Performed by: SURGERY

## 2020-01-29 PROCEDURE — 74011250637 HC RX REV CODE- 250/637: Performed by: PLASTIC SURGERY

## 2020-01-29 PROCEDURE — 77030008684 HC TU ET CUF COVD -B: Performed by: NURSE ANESTHETIST, CERTIFIED REGISTERED

## 2020-01-29 PROCEDURE — 77030040508 HC RESVR DRN WND MDII -A: Performed by: SURGERY

## 2020-01-29 PROCEDURE — 74011250637 HC RX REV CODE- 250/637: Performed by: NURSE ANESTHETIST, CERTIFIED REGISTERED

## 2020-01-29 PROCEDURE — 76060000039 HC ANESTHESIA 4 TO 4.5 HR: Performed by: SURGERY

## 2020-01-29 PROCEDURE — 74011250636 HC RX REV CODE- 250/636: Performed by: NURSE ANESTHETIST, CERTIFIED REGISTERED

## 2020-01-29 PROCEDURE — 76010000135 HC OR TIME 4 TO 4.5 HR: Performed by: SURGERY

## 2020-01-29 PROCEDURE — C1789 PROSTHESIS, BREAST, IMP: HCPCS | Performed by: SURGERY

## 2020-01-29 PROCEDURE — 77030005513 HC CATH URETH FOL11 MDII -B: Performed by: SURGERY

## 2020-01-29 PROCEDURE — 88331 PATH CONSLTJ SURG 1 BLK 1SPC: CPT

## 2020-01-29 PROCEDURE — 77030010507 HC ADH SKN DERMBND J&J -B: Performed by: SURGERY

## 2020-01-29 PROCEDURE — 77030018723 HC ELCTRD BLD COVD -A: Performed by: SURGERY

## 2020-01-29 PROCEDURE — 77030040922 HC BLNKT HYPOTHRM STRY -A

## 2020-01-29 PROCEDURE — 74011000250 HC RX REV CODE- 250: Performed by: NURSE ANESTHETIST, CERTIFIED REGISTERED

## 2020-01-29 PROCEDURE — 77030002933 HC SUT MCRYL J&J -A: Performed by: SURGERY

## 2020-01-29 PROCEDURE — 77030026438 HC STYL ET INTUB CARD -A: Performed by: NURSE ANESTHETIST, CERTIFIED REGISTERED

## 2020-01-29 PROCEDURE — 74011250636 HC RX REV CODE- 250/636: Performed by: PLASTIC SURGERY

## 2020-01-29 PROCEDURE — 77030034626 HC LIGASURE SM JAW SEAL OPN SURG COVD -E: Performed by: SURGERY

## 2020-01-29 PROCEDURE — 77030011640 HC PAD GRND REM COVD -A: Performed by: SURGERY

## 2020-01-29 PROCEDURE — C9290 INJ, BUPIVACAINE LIPOSOME: HCPCS | Performed by: SURGERY

## 2020-01-29 PROCEDURE — 88360 TUMOR IMMUNOHISTOCHEM/MANUAL: CPT

## 2020-01-29 PROCEDURE — 88342 IMHCHEM/IMCYTCHM 1ST ANTB: CPT

## 2020-01-29 DEVICE — SMOOTH, HIGH PROFILE, SUTURE TABS, INTEGRAL INJECTION DOME, 750CC
Type: IMPLANTABLE DEVICE | Site: BREAST | Status: FUNCTIONAL
Brand: ARTOURA BREAST TISSUE EXPANDER

## 2020-01-29 DEVICE — GRAFT HUM TISS W16XL20CM THK07 14MM THN ACELLULAR HYDRATED: Type: IMPLANTABLE DEVICE | Site: BREAST | Status: FUNCTIONAL

## 2020-01-29 RX ORDER — MIDAZOLAM HYDROCHLORIDE 1 MG/ML
INJECTION, SOLUTION INTRAMUSCULAR; INTRAVENOUS AS NEEDED
Status: DISCONTINUED | OUTPATIENT
Start: 2020-01-29 | End: 2020-01-29 | Stop reason: HOSPADM

## 2020-01-29 RX ORDER — SODIUM CHLORIDE AND POTASSIUM CHLORIDE .9; .15 G/100ML; G/100ML
SOLUTION INTRAVENOUS CONTINUOUS
Status: DISCONTINUED | OUTPATIENT
Start: 2020-01-29 | End: 2020-01-30

## 2020-01-29 RX ORDER — SODIUM CHLORIDE 0.9 % (FLUSH) 0.9 %
5-40 SYRINGE (ML) INJECTION AS NEEDED
Status: DISCONTINUED | OUTPATIENT
Start: 2020-01-29 | End: 2020-01-29 | Stop reason: HOSPADM

## 2020-01-29 RX ORDER — PROPOFOL 10 MG/ML
INJECTION, EMULSION INTRAVENOUS AS NEEDED
Status: DISCONTINUED | OUTPATIENT
Start: 2020-01-29 | End: 2020-01-29 | Stop reason: HOSPADM

## 2020-01-29 RX ORDER — ONDANSETRON 2 MG/ML
4 INJECTION INTRAMUSCULAR; INTRAVENOUS
Status: DISCONTINUED | OUTPATIENT
Start: 2020-01-29 | End: 2020-01-30 | Stop reason: HOSPADM

## 2020-01-29 RX ORDER — IRBESARTAN 150 MG/1
150 TABLET ORAL DAILY
Status: DISCONTINUED | OUTPATIENT
Start: 2020-01-30 | End: 2020-01-30 | Stop reason: HOSPADM

## 2020-01-29 RX ORDER — SUCCINYLCHOLINE CHLORIDE 20 MG/ML
INJECTION INTRAMUSCULAR; INTRAVENOUS AS NEEDED
Status: DISCONTINUED | OUTPATIENT
Start: 2020-01-29 | End: 2020-01-29 | Stop reason: HOSPADM

## 2020-01-29 RX ORDER — DEXAMETHASONE SODIUM PHOSPHATE 4 MG/ML
INJECTION, SOLUTION INTRA-ARTICULAR; INTRALESIONAL; INTRAMUSCULAR; INTRAVENOUS; SOFT TISSUE AS NEEDED
Status: DISCONTINUED | OUTPATIENT
Start: 2020-01-29 | End: 2020-01-29 | Stop reason: HOSPADM

## 2020-01-29 RX ORDER — SODIUM CHLORIDE 0.9 % (FLUSH) 0.9 %
5-40 SYRINGE (ML) INJECTION AS NEEDED
Status: DISCONTINUED | OUTPATIENT
Start: 2020-01-29 | End: 2020-01-30 | Stop reason: HOSPADM

## 2020-01-29 RX ORDER — OXYCODONE AND ACETAMINOPHEN 5; 325 MG/1; MG/1
1 TABLET ORAL
Status: DISCONTINUED | OUTPATIENT
Start: 2020-01-29 | End: 2020-01-30 | Stop reason: HOSPADM

## 2020-01-29 RX ORDER — ONDANSETRON 2 MG/ML
INJECTION INTRAMUSCULAR; INTRAVENOUS AS NEEDED
Status: DISCONTINUED | OUTPATIENT
Start: 2020-01-29 | End: 2020-01-29 | Stop reason: HOSPADM

## 2020-01-29 RX ORDER — ROCURONIUM BROMIDE 10 MG/ML
INJECTION, SOLUTION INTRAVENOUS AS NEEDED
Status: DISCONTINUED | OUTPATIENT
Start: 2020-01-29 | End: 2020-01-29 | Stop reason: HOSPADM

## 2020-01-29 RX ORDER — HYDROMORPHONE HYDROCHLORIDE 1 MG/ML
0.2 INJECTION, SOLUTION INTRAMUSCULAR; INTRAVENOUS; SUBCUTANEOUS
Status: DISCONTINUED | OUTPATIENT
Start: 2020-01-29 | End: 2020-01-29 | Stop reason: HOSPADM

## 2020-01-29 RX ORDER — SCOLOPAMINE TRANSDERMAL SYSTEM 1 MG/1
PATCH, EXTENDED RELEASE TRANSDERMAL AS NEEDED
Status: DISCONTINUED | OUTPATIENT
Start: 2020-01-29 | End: 2020-01-29 | Stop reason: HOSPADM

## 2020-01-29 RX ORDER — SODIUM CHLORIDE 0.9 % (FLUSH) 0.9 %
5-40 SYRINGE (ML) INJECTION EVERY 8 HOURS
Status: DISCONTINUED | OUTPATIENT
Start: 2020-01-29 | End: 2020-01-30 | Stop reason: HOSPADM

## 2020-01-29 RX ORDER — AMLODIPINE BESYLATE 5 MG/1
10 TABLET ORAL DAILY
Status: DISCONTINUED | OUTPATIENT
Start: 2020-01-30 | End: 2020-01-30 | Stop reason: HOSPADM

## 2020-01-29 RX ORDER — SODIUM CHLORIDE 0.9 % (FLUSH) 0.9 %
5-40 SYRINGE (ML) INJECTION EVERY 8 HOURS
Status: DISCONTINUED | OUTPATIENT
Start: 2020-01-29 | End: 2020-01-29 | Stop reason: HOSPADM

## 2020-01-29 RX ORDER — DIPHENHYDRAMINE HCL 25 MG
25 CAPSULE ORAL
Status: DISCONTINUED | OUTPATIENT
Start: 2020-01-29 | End: 2020-01-30 | Stop reason: HOSPADM

## 2020-01-29 RX ORDER — METFORMIN HYDROCHLORIDE 500 MG/1
500 TABLET, EXTENDED RELEASE ORAL DAILY
Status: DISCONTINUED | OUTPATIENT
Start: 2020-01-30 | End: 2020-01-30 | Stop reason: HOSPADM

## 2020-01-29 RX ORDER — SODIUM CHLORIDE, SODIUM LACTATE, POTASSIUM CHLORIDE, CALCIUM CHLORIDE 600; 310; 30; 20 MG/100ML; MG/100ML; MG/100ML; MG/100ML
25 INJECTION, SOLUTION INTRAVENOUS CONTINUOUS
Status: DISCONTINUED | OUTPATIENT
Start: 2020-01-29 | End: 2020-01-29

## 2020-01-29 RX ORDER — LIDOCAINE HYDROCHLORIDE 10 MG/ML
0.1 INJECTION, SOLUTION EPIDURAL; INFILTRATION; INTRACAUDAL; PERINEURAL AS NEEDED
Status: DISCONTINUED | OUTPATIENT
Start: 2020-01-29 | End: 2020-01-29 | Stop reason: HOSPADM

## 2020-01-29 RX ORDER — SODIUM CHLORIDE, SODIUM LACTATE, POTASSIUM CHLORIDE, CALCIUM CHLORIDE 600; 310; 30; 20 MG/100ML; MG/100ML; MG/100ML; MG/100ML
25 INJECTION, SOLUTION INTRAVENOUS CONTINUOUS
Status: DISCONTINUED | OUTPATIENT
Start: 2020-01-29 | End: 2020-01-29 | Stop reason: HOSPADM

## 2020-01-29 RX ORDER — FENTANYL CITRATE 50 UG/ML
25 INJECTION, SOLUTION INTRAMUSCULAR; INTRAVENOUS
Status: DISCONTINUED | OUTPATIENT
Start: 2020-01-29 | End: 2020-01-29 | Stop reason: HOSPADM

## 2020-01-29 RX ORDER — HYDROMORPHONE HYDROCHLORIDE 1 MG/ML
0.5 INJECTION, SOLUTION INTRAMUSCULAR; INTRAVENOUS; SUBCUTANEOUS
Status: DISCONTINUED | OUTPATIENT
Start: 2020-01-29 | End: 2020-01-30 | Stop reason: HOSPADM

## 2020-01-29 RX ORDER — LIDOCAINE HYDROCHLORIDE 20 MG/ML
INJECTION, SOLUTION EPIDURAL; INFILTRATION; INTRACAUDAL; PERINEURAL AS NEEDED
Status: DISCONTINUED | OUTPATIENT
Start: 2020-01-29 | End: 2020-01-29 | Stop reason: HOSPADM

## 2020-01-29 RX ORDER — SODIUM CHLORIDE 9 MG/ML
INJECTION, SOLUTION INTRAVENOUS
Status: DISCONTINUED | OUTPATIENT
Start: 2020-01-29 | End: 2020-01-29 | Stop reason: HOSPADM

## 2020-01-29 RX ORDER — IBUPROFEN 600 MG/1
600 TABLET ORAL
Status: DISCONTINUED | OUTPATIENT
Start: 2020-01-29 | End: 2020-01-30 | Stop reason: HOSPADM

## 2020-01-29 RX ORDER — DIPHENHYDRAMINE HYDROCHLORIDE 50 MG/ML
12.5 INJECTION, SOLUTION INTRAMUSCULAR; INTRAVENOUS AS NEEDED
Status: DISCONTINUED | OUTPATIENT
Start: 2020-01-29 | End: 2020-01-29 | Stop reason: HOSPADM

## 2020-01-29 RX ORDER — FENTANYL CITRATE 50 UG/ML
INJECTION, SOLUTION INTRAMUSCULAR; INTRAVENOUS AS NEEDED
Status: DISCONTINUED | OUTPATIENT
Start: 2020-01-29 | End: 2020-01-29 | Stop reason: HOSPADM

## 2020-01-29 RX ORDER — EPHEDRINE SULFATE/0.9% NACL/PF 50 MG/5 ML
SYRINGE (ML) INTRAVENOUS AS NEEDED
Status: DISCONTINUED | OUTPATIENT
Start: 2020-01-29 | End: 2020-01-29 | Stop reason: HOSPADM

## 2020-01-29 RX ORDER — HYDROMORPHONE HYDROCHLORIDE 2 MG/ML
INJECTION, SOLUTION INTRAMUSCULAR; INTRAVENOUS; SUBCUTANEOUS AS NEEDED
Status: DISCONTINUED | OUTPATIENT
Start: 2020-01-29 | End: 2020-01-29 | Stop reason: HOSPADM

## 2020-01-29 RX ORDER — ATORVASTATIN CALCIUM 20 MG/1
20 TABLET, FILM COATED ORAL DAILY
Status: DISCONTINUED | OUTPATIENT
Start: 2020-01-30 | End: 2020-01-30 | Stop reason: HOSPADM

## 2020-01-29 RX ADMIN — HYDROMORPHONE HYDROCHLORIDE 0.5 MG: 2 INJECTION, SOLUTION INTRAMUSCULAR; INTRAVENOUS; SUBCUTANEOUS at 08:14

## 2020-01-29 RX ADMIN — SCOPALAMINE 1 PATCH: 1 PATCH, EXTENDED RELEASE TRANSDERMAL at 07:34

## 2020-01-29 RX ADMIN — OXYCODONE HYDROCHLORIDE AND ACETAMINOPHEN 1 TABLET: 5; 325 TABLET ORAL at 16:55

## 2020-01-29 RX ADMIN — LIDOCAINE HYDROCHLORIDE 100 MG: 20 INJECTION, SOLUTION INTRAVENOUS at 07:39

## 2020-01-29 RX ADMIN — Medication 3 AMPULE: at 07:31

## 2020-01-29 RX ADMIN — Medication 10 ML: at 23:03

## 2020-01-29 RX ADMIN — Medication 1 AMPULE: at 23:03

## 2020-01-29 RX ADMIN — FENTANYL CITRATE 100 MCG: 50 INJECTION, SOLUTION INTRAMUSCULAR; INTRAVENOUS at 07:39

## 2020-01-29 RX ADMIN — Medication 10 ML: at 16:54

## 2020-01-29 RX ADMIN — HYDROMORPHONE HYDROCHLORIDE 0.2 MG: 1 INJECTION, SOLUTION INTRAMUSCULAR; INTRAVENOUS; SUBCUTANEOUS at 12:58

## 2020-01-29 RX ADMIN — PROPOFOL 150 MG: 10 INJECTION, EMULSION INTRAVENOUS at 07:40

## 2020-01-29 RX ADMIN — SODIUM CHLORIDE AND POTASSIUM CHLORIDE: .9; .15 SOLUTION INTRAVENOUS at 11:12

## 2020-01-29 RX ADMIN — ONDANSETRON HYDROCHLORIDE 4 MG: 2 INJECTION, SOLUTION INTRAMUSCULAR; INTRAVENOUS at 10:39

## 2020-01-29 RX ADMIN — DEXAMETHASONE SODIUM PHOSPHATE 8 MG: 4 INJECTION, SOLUTION INTRAMUSCULAR; INTRAVENOUS at 07:45

## 2020-01-29 RX ADMIN — WATER 2 G: 1 INJECTION INTRAMUSCULAR; INTRAVENOUS; SUBCUTANEOUS at 07:50

## 2020-01-29 RX ADMIN — Medication 10 ML: at 23:04

## 2020-01-29 RX ADMIN — MIDAZOLAM HYDROCHLORIDE 2 MG: 1 INJECTION INTRAMUSCULAR; INTRAVENOUS at 07:34

## 2020-01-29 RX ADMIN — SODIUM CHLORIDE, SODIUM LACTATE, POTASSIUM CHLORIDE, AND CALCIUM CHLORIDE 25 ML/HR: 600; 310; 30; 20 INJECTION, SOLUTION INTRAVENOUS at 07:00

## 2020-01-29 RX ADMIN — ROCURONIUM BROMIDE 10 MG: 10 INJECTION INTRAVENOUS at 07:40

## 2020-01-29 RX ADMIN — HYDROMORPHONE HYDROCHLORIDE 0.5 MG: 1 INJECTION, SOLUTION INTRAMUSCULAR; INTRAVENOUS; SUBCUTANEOUS at 22:53

## 2020-01-29 RX ADMIN — SUCCINYLCHOLINE CHLORIDE 140 MG: 20 INJECTION, SOLUTION INTRAMUSCULAR; INTRAVENOUS at 07:40

## 2020-01-29 RX ADMIN — Medication 1 AMPULE: at 16:54

## 2020-01-29 RX ADMIN — SODIUM CHLORIDE AND POTASSIUM CHLORIDE: .9; .15 SOLUTION INTRAVENOUS at 20:52

## 2020-01-29 RX ADMIN — Medication 10 MG: at 09:08

## 2020-01-29 RX ADMIN — PHENYLEPHRINE HYDROCHLORIDE 20 MCG/MIN: 10 INJECTION INTRAVENOUS at 07:56

## 2020-01-29 RX ADMIN — SODIUM CHLORIDE: 900 INJECTION, SOLUTION INTRAVENOUS at 07:45

## 2020-01-29 NOTE — ANESTHESIA POSTPROCEDURE EVALUATION
Post-Anesthesia Evaluation and Assessment    Patient: Fili Sears MRN: [id number]  SSN: [id number]    YOB: 1960  Age: 64 y.o. Sex: female      I have evaluated the patient and they are stable and ready for discharge from the PACU. Cardiovascular Function/Vital Signs  Visit Vitals  /73 (BP 1 Location: Left arm, BP Patient Position: At rest)   Pulse (!) 105   Temp 37.1 °C (98.7 °F)   Resp 10   Ht 5' 5\" (1.651 m)   Wt 104.9 kg (231 lb 4.2 oz)   SpO2 100%   BMI 38.48 kg/m²       Patient is status post General anesthesia for Procedure(s):  RIGHT BREAST MASTECTOMY AND RIGHT BREAST SENTINEL NODE BIOPSY, RIGHT BREAST RECONSTRUCTION WITIH TISSUE EXPANDER AND ACELLULAR DERMAL MATRIX  SENTINEL NODE BIOPSY  BREAST RECONSTRUCTION. Nausea/Vomiting: None    Postoperative hydration reviewed and adequate. Pain:  Pain Scale 1: Visual (01/29/20 1200)  Pain Intensity 1: 0 (01/29/20 1200)   Managed    Neurological Status:   Neuro (WDL): Exceptions to WDL (01/29/20 1055)  Neuro  Neurologic State: Drowsy (01/29/20 1055)  Orientation Level: Oriented to place;Oriented to person;Oriented to situation (01/29/20 1055)  Cognition: Follows commands (01/29/20 1055)  Speech: Clear (01/29/20 1055)  LUE Motor Response: Purposeful (01/29/20 1055)  LLE Motor Response: Purposeful (01/29/20 1055)  RUE Motor Response: Purposeful (01/29/20 1055)  RLE Motor Response: Purposeful (01/29/20 1055)   At baseline    Mental Status, Level of Consciousness: Alert and  oriented to person, place, and time    Pulmonary Status:   O2 Device: Nasal cannula (01/29/20 1200)   Adequate oxygenation and airway patent    Complications related to anesthesia: None    Post-anesthesia assessment completed.  No concerns    Signed By: Ibrahima Jarvis MD     January 29, 2020              Procedure(s):  RIGHT BREAST MASTECTOMY AND RIGHT BREAST SENTINEL NODE BIOPSY, RIGHT BREAST RECONSTRUCTION WITIH TISSUE EXPANDER AND ACELLULAR DERMAL MATRIX  SENTINEL NODE BIOPSY  BREAST RECONSTRUCTION. general    <BSHSIANPOST>    Vitals Value Taken Time   /78 1/29/2020 12:15 PM   Temp 37.1 °C (98.7 °F) 1/29/2020 11:00 AM   Pulse 106 1/29/2020 12:18 PM   Resp 14 1/29/2020 12:18 PM   SpO2 96 % 1/29/2020 12:18 PM   Vitals shown include unvalidated device data.

## 2020-01-29 NOTE — PERIOP NOTES
1055-  Handoff Report from Operating Room to PACU    Report received from CECILIA Mak RN and CECILIA HERNANDEZ regarding Brianrosendo Pandey. Surgeon(s): MD Selene Perez MD  And Procedure(s) (LRB):  RIGHT BREAST MASTECTOMY AND RIGHT BREAST SENTINEL NODE BIOPSY, RIGHT BREAST RECONSTRUCTION WITIH TISSUE EXPANDER AND ACELLULAR DERMAL MATRIX (Right)  SENTINEL NODE BIOPSY (Right)  BREAST RECONSTRUCTION  confirmed   with allergies, drains and dressings discussed. Anesthesia type, drugs, patient history, complications, estimated blood loss, vital signs, intake and output, and last pain medication, lines, reversal medications and temperature were reviewed.

## 2020-01-29 NOTE — BRIEF OP NOTE
BRIEF OPERATIVE NOTE    Date of Procedure: 1/29/2020   Preoperative Diagnosis: RIGHT BREAST CANCER  Postoperative Diagnosis: * No post-op diagnosis entered *    Procedure(s):  RIGHT BREAST MASTECTOMY AND RIGHT BREAST SENTINEL NODE BIOPSY, RIGHT BREAST RECONSTRUCTION WITIH TISSUE EXPANDER AND ACELLULAR DERMAL MATRIX  SENTINEL NODE BIOPSY  BREAST RECONSTRUCTION  Surgeon(s) and Role:  Panel 1:     Patti Harrison MD - Primary  Panel 2:     * Tacho Krishna MD - Primary         Surgical Assistant: jennifer wiley     Surgical Staff:  Circ-1: Shravan Duran  Scrub Tech-1: Dunia MONTIEL  Surg Asst-1: Jennifer Giron case tracking events are documented in the log.   Anesthesia: General   Estimated Blood Loss: 50 ml  Specimens:   ID Type Source Tests Collected by Time Destination   1 : right axillary sentinel node Frozen Section Node  Rai Franks MD 1/29/2020 1376 Pathology   2 : right axillary sentinel node Frozen Section Node  Rai Franks MD 1/29/2020 0820 Pathology      Findings: 2 sln negative   Complications: none  Implants: * No implants in log *    Dictated stat

## 2020-01-29 NOTE — OP NOTES
[address]  OPERATIVE REPORT    Name:  Shelley Amador  MR#:  [id number]  :  1960  ACCOUNT #:  [de-identified]  DATE OF SERVICE:  2020    PREOPERATIVE DIAGNOSIS:  Right breast cancer, multicentric. POSTOPERATIVE DIAGNOSIS:  Right breast cancer, multicentric. PROCEDURE PERFORMED:  Right breast skin sparing mastectomy, right deep axillary sentinel lymph node biopsy. SURGEON:  Carri Nuno MD    ASSISTANT:  Jen Ye. ANESTHESIA:  General.    COMPLICATIONS:  None. SPECIMENS REMOVED:  1. Right axillary sentinel node #1.  2.  Right axillary sentinel node #2. IMPLANTS:  No implants in my part. ESTIMATED BLOOD LOSS:  50 mL. FINDINGS:  Two sentinel lymph nodes negative on frozen section. INDICATION FOR PROCEDURE:  This is a 51-year-old female who had multicentric right breast cancer. She needed mastectomy and sentinel node biopsy. PROCEDURE IN DETAIL:  The patient initially went to nuclear medicine where technetium-99 was injected in the right breast.  She tolerated this well. She went to preop holding area where surgical site was marked by surgeon. Informed consent was obtained. She was taken to the operating room and laid in supine position where general endotracheal anesthesia was induced. A Martinez catheter was placed without complication. Bilateral breast prepped and draped in usual fashion and time-out was performed. Attention was turned to the right axilla and a right inferior axillary hairline incision was made with a #10 blade. Bovie cautery was used to dissect through the axillary fascia. Two sentinel lymph nodes were identified using Neoprobe guidance, excised with LigaSure device and these were sent for frozen section and found be negative. The axillary incision was closed with interrupted 3-0 Vicryl and 4-0 subcuticular Monocryl.   Next reduction pattern incision was made with a #10 blade on the breast.  Bovie cautery was used to dissect superior, medial, inferior, and lateral skin flaps. The breast was removed in total from the chest wall in a medial-to-lateral fashion incorporating the pectoral fascia. The breast was marked short stitch superior and long stitch lateral and sent for permanent pathology. All sponge, needle and instrument counts were correct for this part of the procedure and Dr. Columba Wilde is coming in to do the reconstructive part of the case.       Ifrah Sahni MD      [id number]  D:  01/29/2020 9:08  T:  01/29/2020 10:00  JOB #:  [id number]

## 2020-01-29 NOTE — H&P
HISTORY OF PRESENT ILLNESS  Isabel Vazquez is a 64 y.o. female. HPI NEW patient consult referred by John Galan, Oncology Coordinator for newly diagnosed RIGHT breast cancer.  This was detected on screening mammogram, which led to diagnostic studies and a biopsy, but patient states she has had LEFT nipple drainage in the past and RIGHT breast infection but that there was no palpable lump or specific problem prior to this screening mammogram in October.        OB History   Obstetric Comments   Menarche 17, LMP age 38, # of children 2, age of 4st delivery 23, Hysterectomy/oophorectomy no/no, Breast bx no, history of breast feeding no, BCP yes, Hormone therapy no         Family History:  No family history of breast or ovarian cancer.        Imaging at [address] [place] [address]:  Mammogram, 10/28/19, BILATERAL screening, BIRADS 0  Mammogram, RIGHT dx and RIGHT breast U/S, 11/1/19, BIRADS 4          Past Medical History:   Diagnosis Date    Cancer ([facility] 11/2019     RIGHT breast    Diabetes ([facility]      Hypercholesterolemia      Hypertension              Past Surgical History:   Procedure Laterality Date    HX GYN         D&C    HX GYN         vaginal deliveries    HX GYN         BTL    NEUROLOGICAL PROCEDURE UNLISTED Bilateral       CTR      Social History            Socioeconomic History    Marital status: UNKNOWN       Spouse name: Not on file    Number of children: Not on file    Years of education: Not on file    Highest education level: Not on file   Occupational History    Not on file   Social Needs    Financial resource strain: Not on file    Food insecurity:       Worry: Not on file       Inability: Not on file    Transportation needs:       Medical: Not on file       Non-medical: Not on file   Tobacco Use    Smoking status: Never Smoker    Smokeless tobacco: Never Used   Substance and Sexual Activity    Alcohol use: No    Drug use: No    Sexual activity: Not on file   Lifestyle    Physical activity:       Days per week: Not on file       Minutes per session: Not on file    Stress: Not on file   Relationships    Social connections:       Talks on phone: Not on file       Gets together: Not on file       Attends Latter-day service: Not on file       Active member of club or organization: Not on file       Attends meetings of clubs or organizations: Not on file       Relationship status: Not on file    Intimate partner violence:       Fear of current or ex partner: Not on file       Emotionally abused: Not on file       Physically abused: Not on file       Forced sexual activity: Not on file   Other Topics Concern    Not on file   Social History Narrative    Not on file      OB History    No obstetric history on file.       Obstetric Comments   Menarche 17, LMP age 38, # of children 2, age of 4st delivery 23, Hysterectomy/oophorectomy no/no, Breast bx no, history of breast feeding no, BCP yes, Hormone therapy no                Current Outpatient Medications:     atorvastatin (LIPITOR) 20 mg tablet, TAKE 1 TABLET BY MOUTH ONCE DAILY, Disp: , Rfl: 3    irbesartan (AVAPRO) 150 mg tablet, Take 150 mg by mouth nightly., Disp: , Rfl:     metFORMIN ER (GLUCOPHAGE XR) 500 mg tablet, Take 500 mg by mouth daily. , Disp: , Rfl:     amLODIPine (NORVASC) 10 mg tablet, daily. , Disp: , Rfl:     irbesartan (AVAPRO) 75 mg tablet, TAKE 1 TABLET BY MOUTH AT BEDTIME, Disp: , Rfl: 1    meloxicam (MOBIC) 15 mg tablet, Take 15 mg by mouth as needed for Pain., Disp: , Rfl:   No Known Allergies  Review of Systems   All other systems reviewed and are negative.        Physical Exam  Vitals signs and nursing note reviewed. Constitutional:       Appearance: She is well-developed. HENT:      Head: Normocephalic. Neck:      Musculoskeletal: Neck supple. Thyroid: No thyromegaly. Cardiovascular:      Rate and Rhythm: Normal rate and regular rhythm. Heart sounds: Normal heart sounds.    Pulmonary:      Effort: Pulmonary effort is normal.      Breath sounds: Normal breath sounds. Chest:      Breasts: Breasts are symmetrical.         Right: No inverted nipple, mass, nipple discharge, skin change or tenderness. Left: No inverted nipple, mass, nipple discharge, skin change or tenderness. Abdominal:      Palpations: Abdomen is soft. Musculoskeletal: Normal range of motion. Lymphadenopathy:      Cervical: No cervical adenopathy. Skin:     General: Skin is warm and dry. Neurological:      Mental Status: She is alert and oriented to person, place, and time.       BREAST ULTRASOUND, Preop planning  Indication:preop planning  right Breast 4:00 6 cfn   Technique: The area was scanned using a high-frequency linear-array near-field transducer  Findings: 7 mm irregular mass with dropout  Impression: Biopsy site visible with ultrasound  Disposition:  Will schedule breast mri  ASSESSMENT and PLAN      ICD-10-CM ICD-9-CM     1. Malignant neoplasm of lower-inner quadrant of right breast of female, estrogen receptor positive ([facility] [address] C50.311 174.3 MRI BREAST BI W WO CONT     Z17.0 V86.0 BRAC-ANALYSIS   63 yo female with right breast T1 N0   IDC grade 2 Er+ Pr + Her 2 barbara negative. Questionable second lesion, same quadrant  She is here with family. I have reviewed the imaging and pathology with her and she was given copies of these reports. 90 minutes were spent face-to-face with the patient during this encounter and 90% of that time was spent on counseling and coordination of care. 1. Discussed lumpectomy and radiation vs mastectomy. Discussed reconstruction. MRI ordered to see if patient is a candidate for a lumpectomy. 2. Discussed sentinel lymph node biopsy. 3. Discussed external beam radiation. 4. Discussed hormone therapy. 5. Discussed the possibility of chemotherapy.        Patient had mri followed by mri biopsy  Multicentric breast ca  Will do right mastectomy, sln biopsy  Recon by Dr. Hazel Paget. Will admit postop for obs.

## 2020-01-29 NOTE — PERIOP NOTES
26- Patient's  updated. 1603  TRANSFER - OUT REPORT:    Verbal report given to [facility] RN (name) on Sonal Page  being transferred to 2118 (unit) for routine progression of care       Report consisted of patients Situation, Background, Assessment and   Recommendations(SBAR). Information from the following report(s) SBAR, OR Summary, MAR and Cardiac Rhythm Sinus Tach was reviewed with the receiving nurse. Opportunity for questions and clarification was provided.       Patient transported with:   [employer]

## 2020-01-29 NOTE — ANESTHESIA PREPROCEDURE EVALUATION
Anesthetic History   No history of anesthetic complications            Review of Systems / Medical History  Patient summary reviewed, nursing notes reviewed and pertinent labs reviewed    Pulmonary  Within defined limits                 Neuro/Psych   Within defined limits           Cardiovascular    Hypertension          Hyperlipidemia    Exercise tolerance: >4 METS     GI/Hepatic/Renal  Within defined limits              Endo/Other    Diabetes: type 2    Obesity and cancer    Comments: Right Breast Cancer Other Findings              Physical Exam    Airway  Mallampati: II  TM Distance: > 6 cm  Neck ROM: normal range of motion   Mouth opening: Normal     Cardiovascular    Rhythm: regular  Rate: normal      Pertinent negatives: No murmur   Dental    Dentition: Poor dentition  Comments: Multiple missing teeth, remaining teeth in poor condition   Pulmonary  Breath sounds clear to auscultation               Abdominal  GI exam deferred       Other Findings            Anesthetic Plan    ASA: 3  Anesthesia type: general    Monitoring Plan: BIS      Induction: Intravenous  Anesthetic plan and risks discussed with: Patient

## 2020-01-30 VITALS
OXYGEN SATURATION: 98 % | HEIGHT: 65 IN | WEIGHT: 231.26 LBS | RESPIRATION RATE: 20 BRPM | TEMPERATURE: 99.1 F | HEART RATE: 90 BPM | SYSTOLIC BLOOD PRESSURE: 129 MMHG | DIASTOLIC BLOOD PRESSURE: 64 MMHG | BODY MASS INDEX: 38.53 KG/M2

## 2020-01-30 PROCEDURE — 74011250637 HC RX REV CODE- 250/637: Performed by: PLASTIC SURGERY

## 2020-01-30 PROCEDURE — 99218 HC RM OBSERVATION: CPT

## 2020-01-30 PROCEDURE — 74011250636 HC RX REV CODE- 250/636: Performed by: PLASTIC SURGERY

## 2020-01-30 RX ORDER — OXYCODONE AND ACETAMINOPHEN 5; 325 MG/1; MG/1
1 TABLET ORAL
Qty: 30 TAB | Refills: 0 | Status: SHIPPED | OUTPATIENT
Start: 2020-01-30 | End: 2020-02-04

## 2020-01-30 RX ORDER — IBUPROFEN 600 MG/1
600 TABLET ORAL
Qty: 50 TAB | Refills: 1 | Status: SHIPPED | OUTPATIENT
Start: 2020-01-30 | End: 2020-03-11 | Stop reason: CLARIF

## 2020-01-30 RX ADMIN — ATORVASTATIN CALCIUM 20 MG: 20 TABLET, FILM COATED ORAL at 11:35

## 2020-01-30 RX ADMIN — METFORMIN HYDROCHLORIDE 500 MG: 500 TABLET, EXTENDED RELEASE ORAL at 11:35

## 2020-01-30 RX ADMIN — SODIUM CHLORIDE AND POTASSIUM CHLORIDE 125 ML: .9; .15 SOLUTION INTRAVENOUS at 05:03

## 2020-01-30 RX ADMIN — IRBESARTAN 150 MG: 150 TABLET, FILM COATED ORAL at 11:30

## 2020-01-30 RX ADMIN — AMLODIPINE BESYLATE 10 MG: 5 TABLET ORAL at 11:36

## 2020-01-30 NOTE — PROGRESS NOTES
Plastics    POD 1  Little pain  AVSS  Dressings cdi  No hematoma  Drains serosanguinous    DC home  F/u next week with me.

## 2020-01-30 NOTE — PROGRESS NOTES
Pt being discharged home with BRENDA drains x2. Discharge instructions reviewed with pt. Prescriptions given to patient. IV's removed. All pt belongings packed up. Family at bedside, will be transporting pt home. Discharge to main lobby via wheelchair.

## 2020-01-31 NOTE — OP NOTES
[address]  OPERATIVE REPORT    Name:  Heather Ferro  MR#:  [id number]  :  1960  ACCOUNT #:  [de-identified]  DATE OF SERVICE:  2020    PREOPERATIVE DIAGNOSES:  1. Right breast cancer. 2.  Acquired absence of right breast.    POSTOPERATIVE DIAGNOSES:  1. Right breast cancer. 2.  Acquired absence of right breast.    PROCEDURES PERFORMED:  1. Immediate right breast prepectoral reconstruction with tissue expander (Lerna 750 mL high-profile Artoura filled with 360 mL). 2.  Immediate right breast reconstruction with FlexHD acellular dermal matrix for soft tissue support (16 x 20 cm). SURGEON:  Salomon Primrose. Marie Springer MD    ASSISTANT:  Elliot Dunham. ANESTHESIA:  General endotracheal.    COMPLICATIONS:  None. SPECIMENS REMOVED:  None. IMPLANTS:  As above. ESTIMATED BLOOD LOSS:  Approximately 20 mL    INDICATIONS:  This 27-year-old black female presented with multicentric right breast cancer. She required mastectomy, and was felt to be a barely acceptable candidate for expander/implant reconstruction. Risks, benefits, and alternatives were discussed preoperatively, and she agreed to proceed. PROCEDURE:  After informed stent was obtained, she was marked preoperatively in the standing position. She was then taken to the operating room, where Dr. Shiloh Bullock performed right skin-sparing mastectomy and axillary node biopsy. The patient was redraped. The breast flaps were inspected and found to be satisfactory. We had marked a vertical ellipse for the mastectomy. Additional hemostasis was obtained and the pocket was irrigated. The planned expander dimensions were marked on the chest wall, along with the positions of the suture tabs at 3, 6, and 9 o'clock. Zero Prolene anchoring sutures were placed at each point.     Attention was then turned to the back table, where a 750 mL expander was circumferentially wrapped with a 16 x 20 piece of FlexHD acellular dermal matrix that had been perforated and rinsed in saline and Betadine. This was secured to the suture tabs using 3-0 Monocryl. The expander was deflated. It was then inserted into the breast pocket and secured to the chest wall using the anchoring sutures tagged to the suture tabs. Expander was filled with 360 mL of sterile IV saline. The pocket was irrigated with saline and triple antibiotic solution. The incision was then closed in layers with absorbable suture over two 7-mm flat Michael-Cohn drains. Dermabond and dry dressings were applied. She tolerated the procedure well, was extubated in the room, and was transferred to recovery room in good condition.       MD KAITLYN [id number]  D:  01/31/2020 9:32  T:  01/31/2020 10:59  JOB #:  [id number]  CC:  MD Lidia Winchester MD Emiliano Herd, MD

## 2020-02-12 ENCOUNTER — OFFICE VISIT (OUTPATIENT)
Dept: SURGERY | Age: 60
End: 2020-02-12

## 2020-02-12 VITALS
SYSTOLIC BLOOD PRESSURE: 145 MMHG | HEIGHT: 65 IN | WEIGHT: 231 LBS | DIASTOLIC BLOOD PRESSURE: 71 MMHG | HEART RATE: 79 BPM | BODY MASS INDEX: 38.49 KG/M2

## 2020-02-12 DIAGNOSIS — C50.411 MALIGNANT NEOPLASM OF UPPER-OUTER QUADRANT OF RIGHT FEMALE BREAST, UNSPECIFIED ESTROGEN RECEPTOR STATUS (HCC): Primary | ICD-10-CM

## 2020-02-12 NOTE — PATIENT INSTRUCTIONS

## 2020-02-12 NOTE — PROGRESS NOTES
HISTORY OF PRESENT ILLNESS  Ashli Nash is a 64 y.o. female. HPI  ESTABLISHED patient here for post op follow up RIGHT breast mastectomy. She is doing well. Having some soreness to her breast.  BRENDA drains are draining very little. Has follow up with Dr. Charla Lay after this appointment. 1/29/2020 - RIGHT breast mastectomy and RSNbx, with reconstruction, tissue expander. Dr. Charla Lay  T1 N0  Doing ok  Nodes clear  Margins clear      12/19/2019 - Right breast biopsy  65 yo female with right breast T1 N0   IDC grade 2 Er+ Pr + Her 2 barbara negative. Family History:  No family history of breast or ovarian cancer. FINAL PATHOLOGIC DIAGNOSIS   1. Lymph node, right axillary sentinel node #1, excision:   One lymph node, negative for carcinoma by H&E and cytokeratin stains (0/1)   2. Lymph node, right axillary sentinel node #1, excision:   One lymph node, negative for carcinoma by H&E and cytokeratin stains (0/1)   3. Breast, right, mastectomy (1325 grams): Invasive ductal carcinoma, histologic grade 2 (see synoptic report)   Tumor size: 8 mm in greatest dimension   Ductal carcinoma in situ, nuclear grade 2, solid and cribriform types with comedo-type necrosis   Margins:   Invasive carcinoma: Negative; 28 mm from closest margin (posterior)   Ductal carcinoma in situ: Negative; 4 mm from closest margin (posterior)   Two biopsy clips and biopsy site reactions identified   Breast biomarkers performed on prior core biopsy specimen ([id number]):   Estrogen receptor: Positive (3+, 100%)   Progesterone receptor: Positive (2+, 50%)   HER-2/barbara IHC: Negative (0)   INVASIVE CARCINOMA OF THE BREAST: Resection   SPECIMEN   Procedure: Total mastectomy   Specimen Laterality: Right   TUMOR   Histologic Type:  Invasive carcinoma of no special type (invasive ductal carcinoma, not otherwise specified)   Glandular (Acinar) / Tubular Differentiation: Score 2   Nuclear Pleomorphism: Score 2 [employer] Patient Name: Ramy Varma Specimen #: [id number]   Patient Name: Ramy Varma Page 2 of 4 Printed: 01/31/20 5:16 PM SURGICAL PATHOLOGY REPORT   Mitotic Rate: Score 2   Overall Grade: Grade 2 (scores of 6 or 7)   Tumor Size: 8 x 4 mm   Tumor Focality: Single focus of invasive carcinoma   Ductal Carcinoma In Situ (DCIS): Present   Ductal Carcinoma In Situ (DCIS): Positive for extensive intraductal component (EIC)   Number of Blocks with DCIS: 9   Number of Blocks Examined: 18   Architectural Patterns: Cribriform, Solid   Nuclear Grade: Grade II (intermediate)   Necrosis: Present, central (expansive \"comedo\" necrosis)   Lobular Carcinoma In Situ (LCIS): No LCIS in specimen   Tumor Extent   Nipple DCIS: DCIS does not involve the nipple epidermis   Lymphovascular Invasion: Not identified   Dermal Lymphovascular Invasion: Not identified   Microcalcifications: Present in DCIS, Present in invasive carcinoma   Treatment Effect: No known presurgical therapy   MARGINS   Invasive Carcinoma Margins: Uninvolved by invasive carcinoma   Distance from Closest Margin (Millimeters): 28 mm   Closest Margin: Posterior   DCIS Margins: Uninvolved by DCIS   Distance from Closest Margin (Millimeters): 4 mm   Closest Margin: Posterior   LYMPH NODES   Regional Lymph Nodes: Uninvolved by tumor cells   Number of Lymph Nodes Examined: 2   Number of [place] Nodes Examined: 2   PATHOLOGIC STAGE CLASSIFICATION (pTNM, AJCC 8th Edition)   Primary Tumor (pT): pT1b   Regional Lymph Nodes (pN)   Modifier: (sn): Only sentinel node(s) evaluated.    Category (pN): pN0   ADDITIONAL FINDINGS   Additional Pathologic Findings: Apocrine metaplasia, stromal fibrosis, and microcyst formation   Breast Biomarker Testing Performed on Previous Biopsy:   Estrogen Receptor (ER): Positive (percentage): 100 %   Progesterone Receptor (PgR): Positive (percentage): 50 %   HER2 (by immunohistochemistry): Negative (Score 0)   Testing Performed on Case Number: [id number]       Review of Systems   All other systems reviewed and are negative. Physical Exam  Vitals signs and nursing note reviewed.    Chest:          Comments: Incision healing well        ASSESSMENT and PLAN    ICD-10-CM ICD-9-CM    1. Malignant neoplasm of upper-outer quadrant of right female breast, unspecified estrogen receptor [facility] C50.411 174.4      - patient healing well  - spoke with pathology Dr. Tootie Macdonald   Multifocal disease  T1 N0 ( both tumors 8 mm)  Margins clear

## 2020-02-13 DIAGNOSIS — C50.411 MALIGNANT NEOPLASM OF UPPER-OUTER QUADRANT OF RIGHT FEMALE BREAST, UNSPECIFIED ESTROGEN RECEPTOR STATUS (HCC): Primary | ICD-10-CM

## 2020-03-04 ENCOUNTER — OFFICE VISIT (OUTPATIENT)
Dept: ONCOLOGY | Age: 60
End: 2020-03-04

## 2020-03-04 ENCOUNTER — DOCUMENTATION ONLY (OUTPATIENT)
Dept: ONCOLOGY | Age: 60
End: 2020-03-04

## 2020-03-04 VITALS
HEART RATE: 89 BPM | HEIGHT: 65 IN | BODY MASS INDEX: 39.12 KG/M2 | OXYGEN SATURATION: 94 % | WEIGHT: 234.8 LBS | SYSTOLIC BLOOD PRESSURE: 134 MMHG | DIASTOLIC BLOOD PRESSURE: 76 MMHG | TEMPERATURE: 98.9 F

## 2020-03-04 DIAGNOSIS — Z17.0 MALIGNANT NEOPLASM OF RIGHT BREAST IN FEMALE, ESTROGEN RECEPTOR POSITIVE, UNSPECIFIED SITE OF BREAST (HCC): Primary | ICD-10-CM

## 2020-03-04 DIAGNOSIS — C50.911 MALIGNANT NEOPLASM OF RIGHT BREAST IN FEMALE, ESTROGEN RECEPTOR POSITIVE, UNSPECIFIED SITE OF BREAST (HCC): Primary | ICD-10-CM

## 2020-03-04 DIAGNOSIS — Z17.0 MALIGNANT NEOPLASM OF RIGHT BREAST IN FEMALE, ESTROGEN RECEPTOR POSITIVE, UNSPECIFIED SITE OF BREAST (HCC): ICD-10-CM

## 2020-03-04 DIAGNOSIS — E55.9 VITAMIN D DEFICIENCY: ICD-10-CM

## 2020-03-04 DIAGNOSIS — C50.911 MALIGNANT NEOPLASM OF RIGHT BREAST IN FEMALE, ESTROGEN RECEPTOR POSITIVE, UNSPECIFIED SITE OF BREAST (HCC): ICD-10-CM

## 2020-03-04 PROBLEM — E66.01 SEVERE OBESITY (HCC): Status: ACTIVE | Noted: 2020-03-04

## 2020-03-04 RX ORDER — LETROZOLE 2.5 MG/1
2.5 TABLET, FILM COATED ORAL DAILY
Qty: 30 TAB | Refills: 3 | Status: SHIPPED | OUTPATIENT
Start: 2020-03-04 | End: 2020-07-27

## 2020-03-04 NOTE — PROGRESS NOTES
Kassidy Ocampo is a 64 y.o. AA  female here for new patient appt for:  Chief Complaint   Patient presents with    Breast Cancer     (R)     1. Have you been to the ER, urgent care clinic since your last visit? Hospitalized since your last visit? no    2. Have you seen or consulted any other health care providers outside of the [address] [place] [address] since your last visit? Include any pap smears or colon screening. No    Referred by Velvet Benavidez  Pt here with daughter. Pt had routine mammogram and then followed with Bx. No hysterectomy. Post Menopausal.  ER/LA(+)  HER2 (-)  Pt had mastectomy with reconstruction on Jan 29th.

## 2020-03-04 NOTE — PROGRESS NOTES
Oncology Navigator  Psychosocial Assessment    Reason for Assessment:    []Depression  []Anxiety  []Caregiver Halfway  []Maladaptive Coping with Serious Illness   [] Social Work Referral [x] Initial Assessment  [] Other     Sources of Information:    [x]Patient  [x]Family  [x]Staff  [x]Medical Record    Advance Care Planning:  Advance Care Planning 1/20/2020   Confirm Advance Directive None   Patient Would Like to Complete Advance Directive No       Mental Status:    [x]Alert  []Lethargic  []Unresponsive   [] Unable to assess   Oriented to:  [x]Person  [x]Place  [x]Time  [x]Situation      Barriers to Learning:    []Language  []Developmental  []Cognitive  []Altered Mental Status  []Visual/Hearing Impairment  []Unable to Read/Write  []Motivational   [x]No Barriers Identified  []Other:    Relationship Status:  []Single  [x]  []Significant Other/Life Partner  []  []  []      Living Circumstances:  []Lives Alone  [x]Family/Significant Other in Household  []Roommates  []Children in the Home  []Paid Caregivers  []Assisted Living Facility/Group Home  []Skilled [address]  []Homeless  []Incarcerated  []Environmental/Care Concerns  []other:    Employment Status:  [x]Employed Full-time []Employed Part-time []Homemaker [] Disabled  [] Retired []Other:    Support System:    []Strong  [x]Fair  []Limited    Financial/Legal Concerns:    []Uninsured  []Limited Income/Resources  []Non-Citizen  [x]No Concerns Identified  []Financial POA:    []Other:    Scientology/Spiritual/Existential:  [x]Strong Sense of Spirituality  [x]Involved in [employer]  []Request  Visit  []Expressing Spiritual/Existential Angst  []No Concerns Identified    Coping with Illness:         Patient: Family/Caregiver:   Understanding and Acceptance of Illness/Prognosis  [x] [x]   Strong Sense of Resilience [x] []   Self Reflection [x] []   Engaged Support System [x] []   Does not Readily Discuss Illness [] []   Denial of Terminal Status [] []   Anger [] []   Depression [] []   Anxiety/Fear [] []   Bargaining [] []   Recent Diagnosis/Prognosis [] []   Difficulties with Body Image [] []   Loss of Identity [] []   Excessive Substance Use [] []   Mental Health History [] []   Enmeshed Relationships [] []   History of Loss [] []   Anticipatory Grief [] []   Concern for Complicated Grief [] []   Suicidal Ideation or Plan [] []   Unable to assess [] []            Narrative: Met with patient  and her dtr, Matteo Courser?  to introduce social work navigator role and supports. Pt  for 29 years, 2 children, 1 dtr, 1 son. PT works full time at [employer]. Pt will be coming back for hormonal treatment. Assessment/Action:   1. Introduce self and role of the  in the [employer]. 2. Informed the patient of the [facility] and available resources there. 3. Continue to meet with the patient when she returns to the clinic for ongoing assessment of the patient's adjustment to her diagnosis and treatment. 4. Ongoing psychosocial support as desired by patient. Plan/Referral:   Complementary therapies referral    [place].  Pavan Rivera, MSVALORIE/ESTELITA  Supervisee in Social Work

## 2020-03-04 NOTE — PROGRESS NOTES
PER VORB from Dr. Clovis Solomon LETROZOLE 2.5MG [facility]) ONE TAB ONCE A DAY QUANTITY 30 REFILL 3. PER VORB from  vitamin d 25 hydroxy level as one time order.

## 2020-03-07 LAB — 25(OH)D3+25(OH)D2 SERPL-MCNC: 10 NG/ML (ref 30–100)

## 2020-03-09 ENCOUNTER — TELEPHONE (OUTPATIENT)
Dept: ONCOLOGY | Age: 60
End: 2020-03-09

## 2020-03-09 DIAGNOSIS — Z17.0 MALIGNANT NEOPLASM OF BREAST IN FEMALE, ESTROGEN RECEPTOR POSITIVE, UNSPECIFIED LATERALITY, UNSPECIFIED SITE OF BREAST (HCC): Primary | ICD-10-CM

## 2020-03-09 DIAGNOSIS — C50.919 MALIGNANT NEOPLASM OF BREAST IN FEMALE, ESTROGEN RECEPTOR POSITIVE, UNSPECIFIED LATERALITY, UNSPECIFIED SITE OF BREAST (HCC): Primary | ICD-10-CM

## 2020-03-09 DIAGNOSIS — E55.9 VITAMIN D DEFICIENCY: ICD-10-CM

## 2020-03-09 RX ORDER — ERGOCALCIFEROL 1.25 MG/1
50000 CAPSULE ORAL
Qty: 4 CAP | Refills: 2 | Status: SHIPPED | OUTPATIENT
Start: 2020-03-09 | End: 2020-06-19

## 2020-03-09 NOTE — TELEPHONE ENCOUNTER
Called pt. HIPAA verified by two patient identifiers. Pt aware. Will order vitamin d. PER FERMÍN from Dr. Yasemin Choi ERGOCALCIFEROL 50,000IU ONE CAP BY MOUTH WEEKLY QUANTITY 4 REFILL 2.

## 2020-03-09 NOTE — TELEPHONE ENCOUNTER
----- Message from Celia Mckeon MD sent at 3/8/2020  6:13 PM EDT -----  Start Vit D replacement once a week.

## 2020-03-11 NOTE — PERIOP NOTES
[facility]  Preoperative Instructions        Surgery Date 3/13/20          Time of Arrival 0545    1. On the day of your surgery, please report to the Surgical Services Registration Desk and sign in at your designated time. The Surgery Center is located to the right of the Emergency Room. 2. You must have someone with you to drive you home. You should not drive a car for 24 hours following surgery. Please make arrangements for a friend or family member to stay with you for the first 24 hours after your surgery. 3. Do not have anything to eat or drink (including water, gum, mints, coffee, juice) after midnight 3/12/20?? Oris Kendrick ? This may not apply to medications prescribed by your physician. ?(Please note below the special instructions with medications to take the morning of your procedure.)    4. We recommend you do not drink any alcoholic beverages for 24 hours before and after your surgery. 5. Contact your surgeons office for instructions on the following medications: non-steroidal anti-inflammatory drugs (i.e. Advil, Aleve), vitamins, and supplements. (Some surgeons will want you to stop these medications prior to surgery and others may allow you to take them)  **If you are currently taking Plavix, Coumadin, Aspirin and/or other blood-thinning agents, contact your surgeon for instructions. ** Your surgeon will partner with the physician prescribing these medications to determine if it is safe to stop or if you need to continue taking. Please do not stop taking these medications without instructions from your surgeon    6. Wear comfortable clothes. Wear glasses instead of contacts. Do not bring any money or jewelry. Please bring picture ID, insurance card, and any prearranged co-payment or hospital payment. Do not wear make-up, particularly mascara the morning of your surgery. Do not wear nail polish, particularly if you are having foot /hand surgery.   Wear your hair loose or down, no ponytails, buns, paul pins or clips. All body piercings must be removed. Please shower with antibacterial soap for three consecutive days before and on the morning of surgery, but do not apply any lotions, powders or deodorants after the shower on the day of surgery. Please use a fresh towels after each shower. Please sleep in clean clothes and change bed linens the night before surgery. Please do not shave for 48 hours prior to surgery. Shaving of the face is acceptable. 7. You should understand that if you do not follow these instructions your surgery may be cancelled. If your physical condition changes (I.e. fever, cold or flu) please contact your surgeon as soon as possible. 8. It is important that you be on time. If a situation occurs where you may be late, please call [phone] (OR Holding Area). 9. If you have any questions and or problems, please call [phone] (Pre-admission Testing). 10. Your surgery time may be subject to change. You will receive a phone call the evening prior if your time changes. 11.  If having outpatient surgery, you must have someone to drive you here, stay with you during the duration of your stay, and to drive you home at time of discharge. 12.   In an effort to improve the efficiency, privacy, and safety for all of our Pre-op patients visitors are not allowed in the Holding area. Once you arrive and are registered your family/visitors will be asked to remain in the waiting room. The Pre-op staff will get you from the Surgical Waiting Area and will explain to you and your family/visitors that the Pre-op phase is beginning. The staff will answer any questions and provide instructions for tracking of the patient, by use of the existing tracking number and color-coded status board in the waiting room.   At this time the staff will also ask for your designated spokesperson information in the event that the physician or staff need to provide an update or obtain any pertinent information. The designated spokesperson will be notified if the physician needs to speak to family during the pre-operative phase. If at any time your family/visitors has questions or concerns they may approach the volunteer desk in the waiting area for assistance. Special Instructions:none    TAKE ALL MEDICATIONS DAY OF SURGERY EXCEPT:lipitor,avapro,metformin. I understand a pre-operative phone call will be made to verify my surgery time. In the event that I am not available, I give permission for a message to be left on my answering service and/or with another person?   {yes @ [phone].         ___________________      __________   _________    (Signature of Patient)             (Witness)                (Date and Time)

## 2020-03-13 ENCOUNTER — ANESTHESIA EVENT (OUTPATIENT)
Dept: SURGERY | Age: 60
End: 2020-03-13
Payer: COMMERCIAL

## 2020-03-13 ENCOUNTER — ANESTHESIA (OUTPATIENT)
Dept: SURGERY | Age: 60
End: 2020-03-13
Payer: COMMERCIAL

## 2020-03-13 ENCOUNTER — HOSPITAL ENCOUNTER (OUTPATIENT)
Age: 60
Setting detail: OUTPATIENT SURGERY
Discharge: HOME | End: 2020-03-13
Attending: PLASTIC SURGERY | Admitting: PLASTIC SURGERY
Payer: COMMERCIAL

## 2020-03-13 VITALS
HEART RATE: 85 BPM | WEIGHT: 232.59 LBS | BODY MASS INDEX: 38.75 KG/M2 | RESPIRATION RATE: 16 BRPM | SYSTOLIC BLOOD PRESSURE: 147 MMHG | DIASTOLIC BLOOD PRESSURE: 67 MMHG | TEMPERATURE: 98.2 F | HEIGHT: 65 IN | OXYGEN SATURATION: 99 %

## 2020-03-13 DIAGNOSIS — C50.919 MALIGNANT NEOPLASM OF BREAST IN FEMALE, ESTROGEN RECEPTOR POSITIVE, UNSPECIFIED LATERALITY, UNSPECIFIED SITE OF BREAST (HCC): Primary | ICD-10-CM

## 2020-03-13 DIAGNOSIS — Z17.0 MALIGNANT NEOPLASM OF BREAST IN FEMALE, ESTROGEN RECEPTOR POSITIVE, UNSPECIFIED LATERALITY, UNSPECIFIED SITE OF BREAST (HCC): Primary | ICD-10-CM

## 2020-03-13 LAB
GLUCOSE BLD STRIP.AUTO-MCNC: 110 MG/DL (ref 65–100)
GLUCOSE BLD STRIP.AUTO-MCNC: 113 MG/DL (ref 65–100)
SERVICE CMNT-IMP: ABNORMAL
SERVICE CMNT-IMP: ABNORMAL

## 2020-03-13 PROCEDURE — 76210000006 HC OR PH I REC 0.5 TO 1 HR: Performed by: PLASTIC SURGERY

## 2020-03-13 PROCEDURE — 74011250636 HC RX REV CODE- 250/636: Performed by: ANESTHESIOLOGY

## 2020-03-13 PROCEDURE — 77030040922 HC BLNKT HYPOTHRM STRY -A

## 2020-03-13 PROCEDURE — 77030010507 HC ADH SKN DERMBND J&J -B: Performed by: PLASTIC SURGERY

## 2020-03-13 PROCEDURE — 82962 GLUCOSE BLOOD TEST: CPT

## 2020-03-13 PROCEDURE — 74011250636 HC RX REV CODE- 250/636: Performed by: NURSE ANESTHETIST, CERTIFIED REGISTERED

## 2020-03-13 PROCEDURE — 74011000250 HC RX REV CODE- 250: Performed by: PLASTIC SURGERY

## 2020-03-13 PROCEDURE — 77030002916 HC SUT ETHLN J&J -A: Performed by: PLASTIC SURGERY

## 2020-03-13 PROCEDURE — 74011250637 HC RX REV CODE- 250/637: Performed by: PLASTIC SURGERY

## 2020-03-13 PROCEDURE — 77030002933 HC SUT MCRYL J&J -A: Performed by: PLASTIC SURGERY

## 2020-03-13 PROCEDURE — 77030011264 HC ELECTRD BLD EXT COVD -A: Performed by: PLASTIC SURGERY

## 2020-03-13 PROCEDURE — 76060000032 HC ANESTHESIA 0.5 TO 1 HR: Performed by: PLASTIC SURGERY

## 2020-03-13 PROCEDURE — 76210000021 HC REC RM PH II 0.5 TO 1 HR: Performed by: PLASTIC SURGERY

## 2020-03-13 PROCEDURE — 76010000138 HC OR TIME 0.5 TO 1 HR: Performed by: PLASTIC SURGERY

## 2020-03-13 PROCEDURE — 74011000250 HC RX REV CODE- 250: Performed by: NURSE ANESTHETIST, CERTIFIED REGISTERED

## 2020-03-13 PROCEDURE — 77030018836 HC SOL IRR NACL ICUM -A: Performed by: PLASTIC SURGERY

## 2020-03-13 PROCEDURE — 77030040508 HC RESVR DRN WND MDII -A: Performed by: PLASTIC SURGERY

## 2020-03-13 PROCEDURE — 77030011640 HC PAD GRND REM COVD -A: Performed by: PLASTIC SURGERY

## 2020-03-13 PROCEDURE — 77030010509 HC AIRWY LMA MSK TELE -A: Performed by: ANESTHESIOLOGY

## 2020-03-13 PROCEDURE — 77030013708 HC HNDPC SUC IRR PULS STRY –B: Performed by: PLASTIC SURGERY

## 2020-03-13 PROCEDURE — 77030040506 HC DRN WND MDII -A: Performed by: PLASTIC SURGERY

## 2020-03-13 PROCEDURE — 74011250636 HC RX REV CODE- 250/636: Performed by: PLASTIC SURGERY

## 2020-03-13 PROCEDURE — 77030040361 HC SLV COMPR DVT MDII -B: Performed by: PLASTIC SURGERY

## 2020-03-13 RX ORDER — MIDAZOLAM HYDROCHLORIDE 1 MG/ML
1 INJECTION, SOLUTION INTRAMUSCULAR; INTRAVENOUS AS NEEDED
Status: DISCONTINUED | OUTPATIENT
Start: 2020-03-13 | End: 2020-03-13 | Stop reason: HOSPADM

## 2020-03-13 RX ORDER — SODIUM CHLORIDE 9 MG/ML
50 INJECTION, SOLUTION INTRAVENOUS CONTINUOUS
Status: DISCONTINUED | OUTPATIENT
Start: 2020-03-13 | End: 2020-03-13 | Stop reason: HOSPADM

## 2020-03-13 RX ORDER — PROPOFOL 10 MG/ML
INJECTION, EMULSION INTRAVENOUS AS NEEDED
Status: DISCONTINUED | OUTPATIENT
Start: 2020-03-13 | End: 2020-03-13 | Stop reason: HOSPADM

## 2020-03-13 RX ORDER — FENTANYL CITRATE 50 UG/ML
INJECTION, SOLUTION INTRAMUSCULAR; INTRAVENOUS AS NEEDED
Status: DISCONTINUED | OUTPATIENT
Start: 2020-03-13 | End: 2020-03-13 | Stop reason: HOSPADM

## 2020-03-13 RX ORDER — SODIUM CHLORIDE 0.9 % (FLUSH) 0.9 %
5-40 SYRINGE (ML) INJECTION AS NEEDED
Status: DISCONTINUED | OUTPATIENT
Start: 2020-03-13 | End: 2020-03-13 | Stop reason: HOSPADM

## 2020-03-13 RX ORDER — ONDANSETRON 2 MG/ML
INJECTION INTRAMUSCULAR; INTRAVENOUS AS NEEDED
Status: DISCONTINUED | OUTPATIENT
Start: 2020-03-13 | End: 2020-03-13 | Stop reason: HOSPADM

## 2020-03-13 RX ORDER — DIPHENHYDRAMINE HYDROCHLORIDE 50 MG/ML
12.5 INJECTION, SOLUTION INTRAMUSCULAR; INTRAVENOUS AS NEEDED
Status: DISCONTINUED | OUTPATIENT
Start: 2020-03-13 | End: 2020-03-13 | Stop reason: HOSPADM

## 2020-03-13 RX ORDER — MORPHINE SULFATE 10 MG/ML
2 INJECTION, SOLUTION INTRAMUSCULAR; INTRAVENOUS
Status: DISCONTINUED | OUTPATIENT
Start: 2020-03-13 | End: 2020-03-13 | Stop reason: HOSPADM

## 2020-03-13 RX ORDER — OXYCODONE AND ACETAMINOPHEN 5; 325 MG/1; MG/1
1 TABLET ORAL AS NEEDED
Status: DISCONTINUED | OUTPATIENT
Start: 2020-03-13 | End: 2020-03-13 | Stop reason: HOSPADM

## 2020-03-13 RX ORDER — SODIUM CHLORIDE 0.9 % (FLUSH) 0.9 %
5-40 SYRINGE (ML) INJECTION EVERY 8 HOURS
Status: DISCONTINUED | OUTPATIENT
Start: 2020-03-13 | End: 2020-03-13 | Stop reason: HOSPADM

## 2020-03-13 RX ORDER — CEPHALEXIN 500 MG/1
500 CAPSULE ORAL 4 TIMES DAILY
Qty: 28 CAP | Refills: 0 | Status: SHIPPED | OUTPATIENT
Start: 2020-03-13 | End: 2020-03-20

## 2020-03-13 RX ORDER — SODIUM CHLORIDE, SODIUM LACTATE, POTASSIUM CHLORIDE, CALCIUM CHLORIDE 600; 310; 30; 20 MG/100ML; MG/100ML; MG/100ML; MG/100ML
25 INJECTION, SOLUTION INTRAVENOUS CONTINUOUS
Status: DISCONTINUED | OUTPATIENT
Start: 2020-03-13 | End: 2020-03-13 | Stop reason: HOSPADM

## 2020-03-13 RX ORDER — LIDOCAINE HYDROCHLORIDE 10 MG/ML
0.1 INJECTION, SOLUTION EPIDURAL; INFILTRATION; INTRACAUDAL; PERINEURAL AS NEEDED
Status: DISCONTINUED | OUTPATIENT
Start: 2020-03-13 | End: 2020-03-13 | Stop reason: HOSPADM

## 2020-03-13 RX ORDER — FENTANYL CITRATE 50 UG/ML
50 INJECTION, SOLUTION INTRAMUSCULAR; INTRAVENOUS AS NEEDED
Status: DISCONTINUED | OUTPATIENT
Start: 2020-03-13 | End: 2020-03-13 | Stop reason: HOSPADM

## 2020-03-13 RX ORDER — PHENYLEPHRINE HCL IN 0.9% NACL 0.4MG/10ML
SYRINGE (ML) INTRAVENOUS AS NEEDED
Status: DISCONTINUED | OUTPATIENT
Start: 2020-03-13 | End: 2020-03-13 | Stop reason: HOSPADM

## 2020-03-13 RX ORDER — LIDOCAINE HYDROCHLORIDE 20 MG/ML
INJECTION, SOLUTION EPIDURAL; INFILTRATION; INTRACAUDAL; PERINEURAL AS NEEDED
Status: DISCONTINUED | OUTPATIENT
Start: 2020-03-13 | End: 2020-03-13 | Stop reason: HOSPADM

## 2020-03-13 RX ORDER — MIDAZOLAM HYDROCHLORIDE 1 MG/ML
INJECTION, SOLUTION INTRAMUSCULAR; INTRAVENOUS AS NEEDED
Status: DISCONTINUED | OUTPATIENT
Start: 2020-03-13 | End: 2020-03-13 | Stop reason: HOSPADM

## 2020-03-13 RX ORDER — FENTANYL CITRATE 50 UG/ML
25 INJECTION, SOLUTION INTRAMUSCULAR; INTRAVENOUS
Status: DISCONTINUED | OUTPATIENT
Start: 2020-03-13 | End: 2020-03-13 | Stop reason: HOSPADM

## 2020-03-13 RX ORDER — MIDAZOLAM HYDROCHLORIDE 1 MG/ML
0.5 INJECTION, SOLUTION INTRAMUSCULAR; INTRAVENOUS
Status: DISCONTINUED | OUTPATIENT
Start: 2020-03-13 | End: 2020-03-13 | Stop reason: HOSPADM

## 2020-03-13 RX ORDER — SODIUM CHLORIDE, SODIUM LACTATE, POTASSIUM CHLORIDE, CALCIUM CHLORIDE 600; 310; 30; 20 MG/100ML; MG/100ML; MG/100ML; MG/100ML
75 INJECTION, SOLUTION INTRAVENOUS CONTINUOUS
Status: DISCONTINUED | OUTPATIENT
Start: 2020-03-13 | End: 2020-03-13 | Stop reason: HOSPADM

## 2020-03-13 RX ORDER — ONDANSETRON 2 MG/ML
4 INJECTION INTRAMUSCULAR; INTRAVENOUS AS NEEDED
Status: DISCONTINUED | OUTPATIENT
Start: 2020-03-13 | End: 2020-03-13 | Stop reason: HOSPADM

## 2020-03-13 RX ORDER — BUPIVACAINE HYDROCHLORIDE AND EPINEPHRINE 5; 5 MG/ML; UG/ML
INJECTION, SOLUTION PERINEURAL AS NEEDED
Status: DISCONTINUED | OUTPATIENT
Start: 2020-03-13 | End: 2020-03-13 | Stop reason: HOSPADM

## 2020-03-13 RX ORDER — HYDROMORPHONE HYDROCHLORIDE 1 MG/ML
0.2 INJECTION, SOLUTION INTRAMUSCULAR; INTRAVENOUS; SUBCUTANEOUS
Status: DISCONTINUED | OUTPATIENT
Start: 2020-03-13 | End: 2020-03-13 | Stop reason: HOSPADM

## 2020-03-13 RX ORDER — HYDROCODONE BITARTRATE AND ACETAMINOPHEN 5; 325 MG/1; MG/1
1 TABLET ORAL
Qty: 10 TAB | Refills: 0 | Status: SHIPPED | OUTPATIENT
Start: 2020-03-13 | End: 2020-03-16

## 2020-03-13 RX ADMIN — LIDOCAINE HYDROCHLORIDE 80 MG: 20 INJECTION, SOLUTION EPIDURAL; INFILTRATION; INTRACAUDAL; PERINEURAL at 07:35

## 2020-03-13 RX ADMIN — WATER 2 G: 1 INJECTION INTRAMUSCULAR; INTRAVENOUS; SUBCUTANEOUS at 07:40

## 2020-03-13 RX ADMIN — PROPOFOL 150 MG: 10 INJECTION, EMULSION INTRAVENOUS at 07:35

## 2020-03-13 RX ADMIN — FENTANYL CITRATE 50 MCG: 50 INJECTION, SOLUTION INTRAMUSCULAR; INTRAVENOUS at 07:35

## 2020-03-13 RX ADMIN — ONDANSETRON HYDROCHLORIDE 4 MG: 2 INJECTION, SOLUTION INTRAMUSCULAR; INTRAVENOUS at 07:48

## 2020-03-13 RX ADMIN — SODIUM CHLORIDE, SODIUM LACTATE, POTASSIUM CHLORIDE, AND CALCIUM CHLORIDE 25 ML/HR: 600; 310; 30; 20 INJECTION, SOLUTION INTRAVENOUS at 07:10

## 2020-03-13 RX ADMIN — MIDAZOLAM HYDROCHLORIDE 2 MG: 1 INJECTION, SOLUTION INTRAMUSCULAR; INTRAVENOUS at 07:28

## 2020-03-13 RX ADMIN — Medication 80 MCG: at 08:11

## 2020-03-13 RX ADMIN — Medication 3 AMPULE: at 07:09

## 2020-03-13 NOTE — ANESTHESIA POSTPROCEDURE EVALUATION
Procedure(s):  REMOVAL RIGHT BREAST TISSUE EXPANDER. general    Anesthesia Post Evaluation        Patient location during evaluation: PACU  Patient participation: complete - patient participated  Level of consciousness: awake and alert  Pain management: adequate  Airway patency: patent  Anesthetic complications: no  Cardiovascular status: acceptable  Respiratory status: acceptable  Hydration status: acceptable  Comments: Seen, no complaints, pending transfer   Post anesthesia nausea and vomiting:  none      Vitals Value Taken Time   /71 3/13/2020  9:00 AM   Temp 36.5 °C (97.7 °F) 3/13/2020  8:23 AM   Pulse 84 3/13/2020  9:02 AM   Resp 10 3/13/2020  9:02 AM   SpO2 99 % 3/13/2020  9:02 AM   Vitals shown include unvalidated device data.

## 2020-03-13 NOTE — BRIEF OP NOTE
BRIEF OPERATIVE NOTE    Date of Procedure: 3/13/2020   Preoperative Diagnosis: BREAST CAncer  Postoperative Diagnosis: BREAST CANCER    Procedure(s):  REMOVAL RIGHT BREAST TISSUE EXPANDER  Surgeon(s) and Role:     Rekha Barrera MD - Primary         Surgical Assistant: KASSI    Surgical Staff:  Circ-1: Kate Lassiter  Circ-2: Oanh Chatman  Scrub Tech-1: Sony Barron  Surg Asst-1: Jane Jesus Time In   Incision Start 0745   Incision Close 0813     Anesthesia: General   Estimated Blood Loss: 20ml  Specimens: * No specimens in log *   Findings: see note   Complications: none  Implants: * No implants in log *

## 2020-03-13 NOTE — PERIOP NOTES
Handoff Report from Operating Room to PACU    Report received from MANJEET Gannon Caro, CRNA regarding Fili Sears. Surgeon(s):  Cesar Randle MD  And Procedure(s) (LRB):  REMOVAL RIGHT BREAST TISSUE EXPANDER (Right)  confirmed   with allergies, drains and dressings discussed. Anesthesia type, drugs, patient history, complications, estimated blood loss, vital signs, intake and output, and last pain medication, lines and temperature were reviewed.

## 2020-03-13 NOTE — OP NOTES
[address]  OPERATIVE REPORT    Name:  Candido Tang  MR#:  [id number]  :  1960  ACCOUNT #:  [de-identified]  DATE OF SERVICE:  2020    PREOPERATIVE DIAGNOSES:  1. Right breast cancer. 2.  Acquired absence of right breast.  3.  Right breast infected seroma/abscess. POSTOPERATIVE DIAGNOSES:  1. Right breast cancer. 2.  Acquired absence of right breast.  3.  Right breast infected seroma/abscess. PROCEDURE PERFORMED:  Removal of right breast tissue expander. SURGEON:  Nikolas Reddy MD    ASSISTANT:  Francis Gates. ANESTHESIA:  General endotracheal.    COMPLICATIONS:  None. SPECIMENS REMOVED:  None. IMPLANTS:  Right breast expander removed. ESTIMATED BLOOD LOSS:  Approximately 20 mL. INDICATIONS:  This is a 79-year-old diabetic female presented approximately six weeks after right mastectomy and prepectoral expander reconstruction with acellular dermal matrix. She developed a fluid collection which was drained several times, revealing increasingly purulent fluid. She develops increased erythema and edema of the right breast, and oral antibiotics were not effective. Expander removal was indicated. PROCEDURE:  After informed consent was obtained and under general anesthesia, the right breast was prepped and draped. The mastectomy scar was incised. A large amount of purulent fluid was encountered and drained. She had been cultured in the office prior to this procedure, so no cultures were sent. The expander was drained and removed. The pocket was carefully inspected about 95%. The acellular dermal matrix was well incorporated. The ADM that was sharply excised. The pocket was irrigated with saline and Betadine using a pulsatile . The incision was closed in layers with absorbable suture over a 7-mm flat Michael-Cohn drain. Dermabond and dry dressings were applied.   She tolerated the procedure well and was transferred to the recovery room in good condition after extubation. MD KAITLYN [email or web address]  D:  03/13/2020 8:25  T:  03/13/2020 13:51  JOB #:  [id number]  CC:   MD Marina Hopson MD

## 2020-03-13 NOTE — DISCHARGE INSTRUCTIONS
Empty and record drain output twice daily or as needed. May remove dressings in 24hrs and shower and get incisions wet. No heavy lifting or vigorous activity. TO PREVENT AN INFECTION      1. [address] YOUR HANDS     To prevent infection, good handwashing is the most important thing you or your caregiver can do.  Wash your hands with soap and water or use the hand  we gave you before you touch any wounds. 2. SHOWER     Use the antibacterial soap we gave you when you take a shower.  Shower with this soap until your wounds are healed.  To reach all areas of your body, you may need someone to help you.  Dont forget to clean your belly button with every shower. 3.  USE CLEAN SHEETS     Use freshly cleaned sheets on your bed after surgery.  To keep the surgery site clean, do not allow pets to sleep with you while your wound is still healing. 4. STOP SMOKING     Stop smoking, or at least cut back on smoking     Smoking slows your healing. 5.  CONTROL YOUR BLOOD SUGAR     High blood sugars slow wound healing.  If you are diabetic, control your blood sugar levels before and after your surgery. Patient Education      Hydrocodone/Acetaminophen (Vicodin, Norco, Lortab) - (By mouth)   Why this medicine is used:   Treats pain.   Contact a nurse or doctor right away if you have:  · Blistering, peeling, red skin rash  · Fast or slow heartbeat, shallow breathing, blue lips, fingernails, or skin  · Anxiety, restlessness, muscle spasms, twitching, seeing or hearing things that are not there  · Dark urine or pale stools, yellow skin or eyes  · Extreme weakness, sweating, seizures, cold or clammy skin  · Lightheadedness, dizziness, fainting, fever, sweating     Common side effects:  · Constipation, nausea, vomiting, loss of appetite, stomach pain  · Tiredness or sleepiness  © 2017 [facility] Information is for End User's use only and may not be sold, redistributed or otherwise used for commercial purposes. DISCHARGE SUMMARY from Nurse    PATIENT INSTRUCTIONS:    After general anesthesia or intravenous sedation, for 24 hours or while taking prescription Narcotics:  · Limit your activities  · Do not drive and operate hazardous machinery  · Do not make important personal or business decisions  · Do  not drink alcoholic beverages  · If you have not urinated within 8 hours after discharge, please contact your surgeon on call. Report the following to your surgeon:  · Excessive pain, swelling, redness or odor of or around the surgical area  · Temperature over 100.5  · Nausea and vomiting lasting longer than 4 hours or if unable to take medications  · Any signs of decreased circulation or nerve impairment to extremity: change in color, persistent  numbness, tingling, coldness or increase pain  · Any questions    What to do at Home:  These are general instructions for a healthy lifestyle:    No smoking/ No tobacco products/ Avoid exposure to second hand smoke  Surgeon General's Warning:  Quitting smoking now greatly reduces serious risk to your health. Obesity, smoking, and sedentary lifestyle greatly increases your risk for illness    A healthy diet, regular physical exercise & weight monitoring are important for maintaining a healthy lifestyle    You may be retaining fluid if you have a history of heart failure or if you experience any of the following symptoms:  Weight gain of 3 pounds or more overnight or 5 pounds in a week, increased swelling in our hands or feet or shortness of breath while lying flat in bed. Please call your doctor as soon as you notice any of these symptoms; do not wait until your next office visit. The discharge information has been reviewed with the patient and caregiver. The patient and caregiver verbalized understanding.   Discharge medications reviewed with the patient and caregiver and appropriate educational materials and side effects teaching were provided. ___________________________________________________________________________________________________________________________________    A common side effect of anesthesia following surgery is nausea and/or vomiting. In order to decrease symptoms, it is wise to avoid foods that are high in fat, greasy foods, milk products, and spicy foods for the first 24 hours. Acceptable foods for the first 24 hours following surgery include but are not limited to:     soup   broth    toast    crackers    applesauce    bananas    mashed potatoes,   soft or scrambled eggs   oatmeal    jello    It is important to eat when taking your pain medication. This will help to prevent nausea. If possible, please try to time your meals with your medications. It is very important to stay hydrated following surgery. Sip fluids frequently while awake. Avoid acidic drinks such as citrus juices and soda for 24 hours. Carbonated beverages may cause bloating and gas. Acceptable fluids include:    - water (flavor packets may add variety)  - coffee or tea (in moderation)  - Gatorade  - Jessee-aid  - apple juice  - cranberry juice    You are encouraged to cough and deep breathe every hour when awake. This will help to prevent respiratory complications following anesthesia. You may want to hug a pillow when coughing and sneezing to add additional support to the surgical area and to decrease discomfort if you had abdominal or chest surgery. If you are discharged home with support stockings, you may remove them after 24 hours. Support stockings are used to help prevent blood clots in the legs following surgery. Please take time to review all of your Home Care Instructions and Medication Information sheets provided in your discharge packet. If you have any questions, please contact your surgeons office. Thank you.

## 2020-03-15 NOTE — PROGRESS NOTES
[address], [address] [place] [address] Bhupendra Cruz, [facility]  [phone]        Oncology Consultation Note        Patient: Toby Gibson MRN: [id number]  SSN: [id number]    YOB: 1960  Age: 64 y.o. Sex: female      Subjective: Toby Gibson is a 64 y.o. female who I am seeing in consultation for a new diagnosis of right sided invasive breast carcinoma. This was detected on screening mammogram, which led to diagnostic studies and a biopsy. The patient mentioned having LEFT nipple drainage in the past and RIGHT breast infection but that there was no palpable lump or specific problem prior to this screening mammogram in October. She was seen by Dr. Maryuri Hernandez. The biopsy revealed grade 2 IDC % NH 50%. She underwent a right sided simple mastectomy and there were two foci of disease, both less than 1 cm in size. She is doing well after surgery. She comes to discuss the role of adjuvant treatment.          Review of Systems:    Constitutional: negative  Eyes: negative  Ears, Nose, Mouth, Throat, and Face: negative  Respiratory: negative  Cardiovascular: negative  Gastrointestinal: negative  Genitourinary:negative  Integument/Breast: negative  Hematologic/Lymphatic: negative  Musculoskeletal:negative  Neurological: negative        Past Medical History:   Diagnosis Date    Breast cancer ([address] 2019    right    Cancer ([address] 11/2019    RIGHT breast    Diabetes ([address]     Hypercholesterolemia     Hypertension      Past Surgical History:   Procedure Laterality Date    HX BREAST BIOPSY Right 2019    idc    HX BREAST RECONSTRUCTION Right 1/29/2020    BREAST RECONSTRUCTION performed by Antwon Power MD at [facility] MAIN OR    HX BREAST RECONSTRUCTION Right 3/13/2020    REMOVAL RIGHT BREAST TISSUE EXPANDER performed by Antwon Power MD at [facility] MAIN OR    HX GYN      D&C    HX GYN      vaginal deliveries    HX GYN BTL    HX MASTECTOMY Right 1/29/2020    RIGHT BREAST MASTECTOMY AND RIGHT BREAST SENTINEL NODE BIOPSY, RIGHT BREAST RECONSTRUCTION WITIH TISSUE EXPANDER AND ACELLULAR DERMAL MATRIX performed by [facility] Walker [facility] at [facility] MAIN OR    NEUROLOGICAL PROCEDURE UNLISTED Bilateral     CTR      Family History   Problem Relation Age of Onset    Lung Cancer Mother     Cancer Mother         Lung Cancer    Hypertension Mother     Cancer Father         leukemia    No Known Problems Sister     No Known Problems Brother     No Known Problems Maternal Grandmother     No Known Problems Maternal Grandfather     No Known Problems Paternal Grandmother     No Known Problems Paternal Grandfather     Cancer Brother         Prostate     Social History     Tobacco Use    Smoking status: Never Smoker    Smokeless tobacco: Never Used   Substance Use Topics    Alcohol use: No      Prior to Admission medications    Medication Sig Start Date End Date Taking? Authorizing Provider   letrozole [facility]) 2.5 mg tablet Take 1 Tab by mouth daily. 3/4/20  Yes Ronald Allen MD   atorvastatin (LIPITOR) 20 mg tablet TAKE 1 TABLET BY MOUTH ONCE DAILY 11/18/19  Yes Provider, Historical   irbesartan (AVAPRO) 150 mg tablet Take 150 mg by mouth daily. Yes Provider, Historical   metFORMIN ER (GLUCOPHAGE XR) 500 mg tablet Take 500 mg by mouth daily. 2/21/19  Yes Provider, Historical   amLODIPine (NORVASC) 10 mg tablet daily. 11/1/18  Yes Provider, Historical   HYDROcodone-acetaminophen (NORCO) 5-325 mg per tablet Take 1 Tab by mouth every four (4) hours as needed for Pain for up to 3 days. Max Daily Amount: 6 Tabs. 3/13/20 3/16/20  Clyde Holland MD   cephALEXin (Keflex) 500 mg capsule Take 1 Cap by mouth four (4) times daily for 7 days. 3/13/20 3/20/20  Clyde Holland MD   ergocalciferol (ERGOCALCIFEROL) 1,250 mcg (50,000 unit) capsule Take 1 Cap by mouth every seven (7) days.  3/9/20   Ronald Allen MD              No Known Allergies        Objective:     Vitals:    03/04/20 1422   BP: 134/76   Pulse: 89   Temp: 98.9 °F (37.2 °C)   TempSrc: Oral   SpO2: 94%   Weight: 234 lb 12.8 oz (106.5 kg)   Height: 5' 5\" (1.651 m)            Physical Exam:    GENERAL: alert, cooperative, no distress, appears stated age  EYE: conjunctivae/corneas clear. PERRL, EOM's intact  LYMPHATIC: Cervical, supraclavicular, and axillary nodes normal.   THROAT & NECK: normal and no erythema or exudates noted. LUNG: clear to auscultation bilaterally  HEART: regular rate and rhythm, S1, S2 normal, no murmur, click, rub or gallop  ABDOMEN: soft, non-tender. Bowel sounds normal. No masses,  no organomegaly  EXTREMITIES:  extremities normal, atraumatic, no cyanosis or edema  SKIN: Normal.  NEUROLOGIC: AOx3. Gait normal. Reflexes and motor strength normal and symmetric. Cranial nerves 2-12 and sensation grossly intact. Assessment:     1. Right breast carcinoma:  T1b N0 M0 (Stage I) infiltrating ductal carcinoma, Tumor size two foci both 8 mm in size, LN -ve, grade 2, %, MI 50%, Her 2 -ve      ECOG PS 0  Intent of Treatment - curative  Prognosis - excellent    S/P right breast simple mastectomy 01/29/2020    Patient sent for consideration of adjuvant therapy. I spent significant time in explaining the rationale of adjuvant therapy is to reduce the risk of recurrence and improve the chances of cure. The risk of recurrence in the next 5 years is around 10-15%. Since the tumor is small, LN -ve and highly ER+ve, she would not benefit from adjuvant chemotherapy and thus I did not offer her this therapy. She will however benefit from adjuvant hormonal therapy. Adjuvant Aromatase inhibitor would reduce the risk of recurrence by 50% on an average. I counseled the patient regarding the hormonal therapy. Discussions included side-effect and benefit of hormonal therapy.   She understood the expected side-effect which includes hot flashes, myalgias/arthralgias, osteopenia/osteoporosis with aromatase inhibitor. She agrees to take Letrozole. She understands the alternative to this is observation alone. I spent 90 minutes with the patient in a face-to-face encounter. I explained her the stage of the disease, pathophysiology of the disease and the treatment approaches. I answered all her questions. More than 50% of the time was utilized in education, counseling and co-ordination of care. The patient's emotional well being was addressed during this office visit and patient seems to be coping well with the diagnosis and the treatment. Plan:       1. Start Letrozole  2. Return in 3 months      Signed By: Dixie Tiwari MD     March 15, 2020         CC. Larissa Guzman MD  CC.  Nils Paige MD

## 2020-03-18 ENCOUNTER — TELEPHONE (OUTPATIENT)
Dept: SURGERY | Age: 60
End: 2020-03-18

## 2020-03-18 NOTE — TELEPHONE ENCOUNTER
Called patient re RTW form that we received from [facility]. It looks like she just had further surgery with Dr. Annetta Beckford on 3/13/2020. I let her know that Dr. Annetta Beckford should be the one to fill out this paperwork since she has just had further surgery. She has had RTW paperwork faxed to him from her work and is asking him to fill this out. I asked her if we could go ahead and shred the paperwork that we received, and she is fine with this. She understands that restrictions should come from Dr. Annetta Beckford. She is doing well post-op and was appreciative of the call.

## 2020-03-24 ENCOUNTER — DOCUMENTATION ONLY (OUTPATIENT)
Dept: SURGERY | Age: 60
End: 2020-03-24

## 2020-03-24 NOTE — PROGRESS NOTES
Returned call to Lavon Yoon at [facility] ([facility]) at [phone]. Patient was scheduled to RTW on 3/11/2020, but to her knowledge she has not. I told her that I had spoken to the patient on 3/18/2020. She had just had further surgery by Dr. Demian Rankin. I told the patient that extended time off would have to be according to his direction, and she understood. I told Lavon Yoon that the patient told me that she was going to follow-up with Dr. Adalgisa Mckenzie and have him complete paperwork. I gave her Dr. Amy Sterling number, and she is going to follow-up with his office. She was very appreciative of the information.

## 2020-05-04 NOTE — ADVANCED PRACTICE NURSE
LAQUITA 4 in PAT assessment. Pt admits to snoring loud enough to be heard through a closed door, but denies witnessed apnea while sleeping or ever having been referred for a sleep apnea evaluation. Denies decreased cervical ROM. Denies loose teeth, partial plates or dentures. Denies prior complications from anesthesia. Spoke with patient, patient stated that she wants to get back on birth control, advised patient that she would have to come in clinic for a visit or she can do a video visit, patient went with video visit, patient scheduled for Thursday for 3:00 pm video visit, patient voiced understanding.

## 2020-06-12 DIAGNOSIS — C50.919 MALIGNANT NEOPLASM OF BREAST IN FEMALE, ESTROGEN RECEPTOR POSITIVE, UNSPECIFIED LATERALITY, UNSPECIFIED SITE OF BREAST (HCC): ICD-10-CM

## 2020-06-12 DIAGNOSIS — Z17.0 MALIGNANT NEOPLASM OF BREAST IN FEMALE, ESTROGEN RECEPTOR POSITIVE, UNSPECIFIED LATERALITY, UNSPECIFIED SITE OF BREAST (HCC): ICD-10-CM

## 2020-06-12 DIAGNOSIS — E55.9 VITAMIN D DEFICIENCY: ICD-10-CM

## 2020-06-18 NOTE — PATIENT INSTRUCTIONS
Continue your [place] on Sunday and Wednesday, I will call you with the results of your labs. Post-Care Instructions: I reviewed with the patient in detail post-care instructions. Patient is to wear sunprotection, and avoid picking at any of the treated lesions. Pt may apply Vaseline to crusted or scabbing areas. Include Z78.9 (Other Specified Conditions Influencing Health Status) As An Associated Diagnosis?: No Duration Of Freeze Thaw-Cycle (Seconds): 10 Consent: The patient's consent was obtained including but not limited to risks of crusting, scabbing, blistering, scarring, darker or lighter pigmentary change, recurrence, incomplete removal and infection. Medical Necessity Information: It is in your best interest to select a reason for this procedure from the list below. All of these items fulfill various CMS LCD requirements except the new and changing color options. Number Of Freeze-Thaw Cycles: 3 freeze-thaw cycles Medical Necessity Clause: This procedure was medically necessary because the lesions that were treated were: Detail Level: Detailed

## 2020-06-19 RX ORDER — ERGOCALCIFEROL 1.25 MG/1
CAPSULE ORAL
Qty: 4 CAP | Refills: 2 | Status: SHIPPED | OUTPATIENT
Start: 2020-06-19 | End: 2020-10-12

## 2020-06-19 NOTE — TELEPHONE ENCOUNTER
PER VORB from Dr. Nanda Hernandez ERGOCALCIFEROL 50,000IU ONE CAP BY MOUTH WEEKLY QUANTITY 4 REFILL 2.

## 2020-07-23 DIAGNOSIS — E55.9 VITAMIN D DEFICIENCY: ICD-10-CM

## 2020-07-23 DIAGNOSIS — C50.911 MALIGNANT NEOPLASM OF RIGHT BREAST IN FEMALE, ESTROGEN RECEPTOR POSITIVE, UNSPECIFIED SITE OF BREAST (HCC): ICD-10-CM

## 2020-07-23 DIAGNOSIS — Z17.0 MALIGNANT NEOPLASM OF RIGHT BREAST IN FEMALE, ESTROGEN RECEPTOR POSITIVE, UNSPECIFIED SITE OF BREAST (HCC): ICD-10-CM

## 2020-07-27 RX ORDER — LETROZOLE 2.5 MG/1
TABLET, FILM COATED ORAL
Qty: 30 TAB | Refills: 3 | Status: SHIPPED | OUTPATIENT
Start: 2020-07-27 | End: 2021-01-20 | Stop reason: SDUPTHER

## 2020-07-27 NOTE — TELEPHONE ENCOUNTER
PER FERMÍN from Dr. Milly Sequeira LETROZOLE 2.5MG [facility]) ONE TAB ONCE A DAY QUANTITY 30 REFILL 3.

## 2020-09-09 ENCOUNTER — VIRTUAL VISIT (OUTPATIENT)
Dept: ONCOLOGY | Age: 60
End: 2020-09-09
Payer: COMMERCIAL

## 2020-09-09 DIAGNOSIS — C50.911 MALIGNANT NEOPLASM OF RIGHT BREAST IN FEMALE, ESTROGEN RECEPTOR POSITIVE, UNSPECIFIED SITE OF BREAST (HCC): Primary | ICD-10-CM

## 2020-09-09 DIAGNOSIS — Z17.0 MALIGNANT NEOPLASM OF RIGHT BREAST IN FEMALE, ESTROGEN RECEPTOR POSITIVE, UNSPECIFIED SITE OF BREAST (HCC): Primary | ICD-10-CM

## 2020-09-09 PROCEDURE — 99214 OFFICE O/P EST MOD 30 MIN: CPT | Performed by: INTERNAL MEDICINE

## 2020-09-09 NOTE — PROGRESS NOTES
Marquis Ashley is a 64 y.o. female  Chief Complaint   Patient presents with    Breast Cancer     R    Medication Evaluation     Letrozole     1. Have you been to the ER, urgent care clinic since your last visit? Hospitalized since your last visit? no    2. Have you seen or consulted any other health care providers outside of the [address] [place] [address] since your last visit? Include any pap smears or colon screening. Routine check up with PCP last month    Pt states she is having no pain or hot flashes with Letrozole. Going well. She has no complaints.

## 2020-09-09 NOTE — PROGRESS NOTES
[address], [address] [place] [address] Bhupendra Mandujanoineau, [facility]  [phone]        Oncology Follow Up Note        Patient: Becky Newton MRN: [id number]  SSN: [id number]    YOB: 1960  Age: 64 y.o. Sex: female        Reason for Visit:   Becky Newton is a 64 y.o. female who is seen by synchronous (real-time) audio-video technology for follow up of right sided breast carcinoma      Diagnosis:     1. Right breast carcinoma:  T1b N0 M0 (Stage I) infiltrating ductal carcinoma, Tumor size two foci both 8 mm in size, LN -ve, grade 2, %, NM 50%, Her 2 -ve     Subjective: Becky Newton is a 64 y.o. female who I am seeing in consultation for a new diagnosis of right sided invasive breast carcinoma. This was detected on screening mammogram, which led to diagnostic studies and a biopsy. The patient mentioned having LEFT nipple drainage in the past and RIGHT breast infection but that there was no palpable lump or specific problem prior to this screening mammogram in October. She was seen by Dr. Marie Gonzalez. The biopsy revealed grade 2 IDC % NM 50%. She underwent a right sided simple mastectomy and there were two foci of disease, both less than 1 cm in size. She is doing well. She is taking letrozole and is asymptomatic.        Review of Systems:    Constitutional: negative  Eyes: negative  Ears, Nose, Mouth, Throat, and Face: negative  Respiratory: negative  Cardiovascular: negative  Gastrointestinal: negative  Genitourinary:negative  Integument/Breast: negative  Hematologic/Lymphatic: negative  Musculoskeletal:negative  Neurological: negative        Past Medical History:   Diagnosis Date    Breast cancer ([address] 2019    right    Cancer ([address] 11/2019    RIGHT breast    Diabetes ([address]     Hypercholesterolemia     Hypertension      Past Surgical History:   Procedure Laterality Date    HX BREAST BIOPSY Right 2019 idc    HX BREAST RECONSTRUCTION Right 1/29/2020    BREAST RECONSTRUCTION performed by Federico Mathews MD at [facility] MAIN OR    HX BREAST RECONSTRUCTION Right 3/13/2020    REMOVAL RIGHT BREAST TISSUE EXPANDER performed by Federico Mathews MD at [facility] MAIN OR    HX GYN      D&C    HX GYN      vaginal deliveries    HX GYN      BTL    HX MASTECTOMY Right 1/29/2020    RIGHT BREAST MASTECTOMY AND RIGHT BREAST SENTINEL NODE BIOPSY, RIGHT BREAST RECONSTRUCTION WITIH TISSUE EXPANDER AND ACELLULAR DERMAL MATRIX performed by Estrella Muhammad MD at [facility] MAIN OR    NEUROLOGICAL PROCEDURE UNLISTED Bilateral     CTR      Family History   Problem Relation Age of Onset    Lung Cancer Mother     Cancer Mother         Lung Cancer    Hypertension Mother     Cancer Father         leukemia    No Known Problems Sister     No Known Problems Brother     No Known Problems Maternal Grandmother     No Known Problems Maternal Grandfather     No Known Problems Paternal Grandmother     No Known Problems Paternal Grandfather     Cancer Brother         Prostate     Social History     Tobacco Use    Smoking status: Never Smoker    Smokeless tobacco: Never Used   Substance Use Topics    Alcohol use: No      Prior to Admission medications    Medication Sig Start Date End Date Taking? Authorizing Provider   letrozole [facility]) 2.5 mg tablet Take 1 tablet by mouth once daily 7/27/20  Yes Анна Cooper MD   Vitamin D2 1,250 mcg (50,000 unit) capsule Take 1 capsule by mouth once a week 6/19/20  Yes Анна Cooper MD   atorvastatin (LIPITOR) 20 mg tablet TAKE 1 TABLET BY MOUTH ONCE DAILY 11/18/19  Yes Provider, Historical   irbesartan (AVAPRO) 150 mg tablet Take 150 mg by mouth daily. Yes Provider, Historical   metFORMIN ER (GLUCOPHAGE XR) 500 mg tablet Take 500 mg by mouth daily. 2/21/19  Yes Provider, Historical   amLODIPine (NORVASC) 10 mg tablet daily.  11/1/18  Yes Provider, Historical              No Known Allergies        Objective:     General: alert, cooperative, no distress   Mental  status: normal mood, behavior, speech, dress, motor activity, and thought processes, able to follow commands   HENT: NCAT   Neck: no visualized mass   Resp: no respiratory distress   Neuro: no gross deficits   Skin: no discoloration or lesions of concern on visible areas   Psychiatric: normal affect, consistent with stated mood, no evidence of hallucinations       Due to this being a TeleHealth evaluation (During ACTKA-33 public health emergency), many elements of the physical examination are unable to be assessed. Evaluation of the following organ systems was limited: Vitals/Constitutional/EENT/Resp/CV/GI//MS/Neuro/Skin/Heme-Lymph-Imm. Assessment:     1. Right breast carcinoma:  T1b N0 M0 (Stage I) infiltrating ductal carcinoma, Tumor size two foci both 8 mm in size, LN -ve, grade 2, %, MD 50%, Her 2 -ve      ECOG PS 0  Intent of Treatment - curative  Prognosis - excellent    S/P right breast simple mastectomy 01/29/2020  Taking Letrozole  Tolerating treatment very well  Denies any side effects. In remission      Plan:       1. Continue Letrozole  2. Return in 6 months      Signed By: Parth Hoffmann MD     September 9, 2020         CC. Jonatan Jeronimo MD  CC. Claudia Pond MD      I was in the office while conducting this encounter. The patient was at her home. Consent:  She and/or her healthcare decision maker is aware that this patient-initiated Telehealth encounter is a billable service, with coverage as determined by her insurance carrier.  She is aware that she may receive a bill and has provided verbal consent to proceed: Yes    Pursuant to the emergency declaration under the [address] and the [facility], [facility]5 waiver authority and the [employer] Act, this Virtual  Visit was conducted, with patient's (and/or legal guardian's) consent, to reduce the patient's risk of exposure to COVID-19 and provide necessary medical care. Services were provided through a video synchronous discussion virtually to substitute for in-person visit.

## 2020-10-11 DIAGNOSIS — Z17.0 MALIGNANT NEOPLASM OF BREAST IN FEMALE, ESTROGEN RECEPTOR POSITIVE, UNSPECIFIED LATERALITY, UNSPECIFIED SITE OF BREAST (HCC): ICD-10-CM

## 2020-10-11 DIAGNOSIS — E55.9 VITAMIN D DEFICIENCY: ICD-10-CM

## 2020-10-11 DIAGNOSIS — C50.919 MALIGNANT NEOPLASM OF BREAST IN FEMALE, ESTROGEN RECEPTOR POSITIVE, UNSPECIFIED LATERALITY, UNSPECIFIED SITE OF BREAST (HCC): ICD-10-CM

## 2020-10-12 RX ORDER — ERGOCALCIFEROL 1.25 MG/1
CAPSULE ORAL
Qty: 4 CAP | Refills: 0 | Status: SHIPPED | OUTPATIENT
Start: 2020-10-12 | End: 2020-12-08

## 2020-11-11 ENCOUNTER — DOCUMENTATION ONLY (OUTPATIENT)
Dept: ONCOLOGY | Age: 60
End: 2020-11-11

## 2020-11-11 ENCOUNTER — TELEPHONE (OUTPATIENT)
Dept: SURGERY | Age: 60
End: 2020-11-11

## 2020-11-11 ENCOUNTER — TELEPHONE (OUTPATIENT)
Dept: ONCOLOGY | Age: 60
End: 2020-11-11

## 2020-11-11 NOTE — TELEPHONE ENCOUNTER
Returned call to pt. HIPAA verified by two patient identifiers. Pt got mammo at [facility]. I asked for her to please call 's office, she can determine if biopsy is needed etc.. Number provided to pt. She  Stated understanding and thanked me for the prompt return call.

## 2020-11-11 NOTE — PROGRESS NOTES
This Survivorship Care Plan is a cancer treatment summary and follow-up plan and is provided to you to keep with your health care records and to share with your primary care provider or any of your doctors and nurses. This summary is a brief record of major aspects of your cancer treatment and not a detailed or comprehensive record of your care. You should review this with your cancer provider. General Information   Patient Name: Jones Drummond   Patient [facility] Providers (Including Names, Institution)   Primary Care Provider: Abbey Mayers MD, [address] [facility]   Surgeon:  Srikanth Peacock 180 Oncologist: N/A   Medical Oncologist: Fabiola Valderrama MD,  [address]   Other Providers: Dr Tosha Garduno Plastic Surgery   Treatment Summary   Diagnosis   Cancer Type/Histology Subtype:  right breast cancer -Infiltrating Ductal Carcinoma    Receptors:  Estrogen Receptor:pos  Progesterone Receptor:pos  Her-2:neg Diagnosis Date (year):2019   Stage:    1     Treatment Completed   Surgery: yes Surgery Date(s) (year): 2020   Surgical procedure/findings: 1. Right skin sparing mastectomy with right axillary sentinel lymph node biopsy  2. Right breast reconstruction with tissue expander  3.  Removal of right tissue expander    Lymph node removal: right sentinel lymph node     Radiation: N/A   Body area treated: N/A End Date (year):    Systemic Therapy (chemotherapy, hormonal therapy, other): N/A     Names of Agents Used End Dates (year)           Treatment Ongoing   Additional treatment name Planned duration Possible Side effects   Letrozole     5 years   Hot flashes, myalgias/arthralgias  Osteopenia/osteoporosis  Other rare side effects may occur                 Familial Cancer Risk Assessment   Breast and/or ovarian cancer in 1st or 2nd degree relatives: no     Received Genetic counseling: Genetic testing:   Julio Cesar Henry testing results: BRCA 1/2 negative     Follow-up Care Plan   Your follow-up care plan is designed to inform you and primary care providers regarding the recommended and required follow-up, cancer screening, and routine health maintenance that is needed to maintain optimal health. Possible late- and long-term effects that someone with this type of cancer and treatment may experience if received certain chemotherapy agents:  Weakening of the heart presenting as shortness of breath and swelling of legs (rare < 5%); and bones become weak and at risk for fracture (osteoporosis). It is important to remember that these symptoms can be due to other causes like diabetes or with normal aging. If these or any other new symptoms occur, bring these to the attention of your health care provider. These symptoms should be brought to the attention of your provider:   1. Anything that represents a brand new symptom. 2. Anything that represents a persistent symptom. 3.   Anything you are worried about that might be related to the cancer coming back. Please continue to see your primary care provider for all general health care recommended for a woman your age, such as routine immunizations and routine non-breast cancer screening, such as a colonoscopy or bone density exams. Consult with your health care provider about prevention and screening for bone loss using bone density tests. Schedule for Clinical Visits   Coordinating Provider When/How often   Dr Shaji Britt 6 months   Dr Yahir Chapa As directed by provider   Dr Primo Cole As directed by provider   [facility] (NCCN) guidelines recommend patients  have a history and physical exam 1-4 times a year, as clinically appropriate, for   5 years, then annually.      Coordinating Provider TEST How often   Dr Jonn Dudley Annually    [facility] Friends MRI breast As indicated by provider   PCP/Gyn Pap/pelvic exam As indicated by provider   PCP/GI Colonoscopy As indicated by provider   Dr Sher Howard Every 2 years, if on an aromatase inhibitor, or as indicated by your provider   Breast cancer survivors may experience issues with the areas listed below. If you have any concerns in these or other areas, please speak with your doctors or nurses to find out how you can get help with them. Anxiety or depression                          Insurance     Emotional and mental health                         Parenting  Memory or concentration loss                                   Fertility  Stopping Smoking                                                     Fatigue                            Weight changes                                                        School/work  Financial advice or assistance                         Sexual functioning   Physical functioning     A number of lifestyle/behaviors can affect your ongoing health, including the risk for the cancer coming back, or developing another cancer. Discuss these recommendations with your doctor or nurse:    Alcohol use                          Physical activity                Diet                           Sun screen use      Management of my medications                        Tobacco use/cessation       Management of my other illnesses                        Weight management (loss/gain)                                Resources you may be interested in:        www.cancer. net                    Offers educational support and guidelines for treatment and follow up care.  Cancercare. org                    Cancer Care offers financial, emotional, and educational support.  www.nccn.org/patients                  Offers educational support and guidelines for treatment and follow up care.    www.lbbc.org                   [facility] offers educational support and provides information on many topics including intimacy and sexuality. [address] offers complimentary services including massages, yoga, meditation, acupuncture, counseling, art therapy, and music therapy.   The [facility] is located at:                  [address] [phone]                        Other comments:   Prepared by:  Kelly Canela Rn                                                                                     Delivered on:11/11/20

## 2020-11-11 NOTE — TELEPHONE ENCOUNTER
Patient called and stated and her PCP found a new spot on her other breast and wants to know if her pcp should do further testing or does dr. Seun Mak want to see her in the office to do further testing since she already sees him. She would like a callback.       [phone]

## 2020-11-11 NOTE — TELEPHONE ENCOUNTER
Returned patient's call. Had a recent mammogram and breast MRI at [facility]. Was called by radiologist, told she needed additional \"studies. \"   She wants to follow-up with Dr. Jace Medina since she has seen her before. I asked her to get her mammogram and MRI images on disc and also bring reports with her to her appointment. Transferred to [facility], who made an appointment for her for next Friday 11/20/20. She was very appreciative.

## 2020-11-16 ENCOUNTER — TELEPHONE (OUTPATIENT)
Dept: SURGERY | Age: 60
End: 2020-11-16

## 2020-11-16 NOTE — TELEPHONE ENCOUNTER
Patient has an appt with Dr. Celestina Saucedo on Friday. She is going to bring her mammogram and breast mri CDs and reports with her. She is also getting additional mammogram done on Thursday. I told her to request that to be put on disk as well.

## 2020-11-19 NOTE — PROGRESS NOTES
HISTORY OF PRESENT ILLNESS  Cecilia Mei is a 64 y.o. female. HPI ESTABLISHED patient here for abnormal breast imaging of LEFT breast. She is not able to feel any abnormalities. Her  passed away in August from cirrhosis of the liver and kidney failure. She has not followed up with Dr. Hank Castro regarding breast reconstruction on the RIGHT. Breast cancer-  12/19/2019 - Right breast biopsy. 63 yo female with right breast T1 N0, IDC grade 2 Er+ Pr + Her 2 barbara negative. 1/29/2020 - RIGHT breast mastectomy and RSNbx, with reconstruction, tissue expander. Dr. Hank Castro. Surg path multifocal disease (both tumors 8 mm), T1 N0, Nodes clear, Margins clear   3/13/2020 - had tissue expander removed (Dr. Hank Castro) for infection. Family History-  No family history of breast or ovarian cancer. Breast imaging-  10/30/2020 - LEFT breast mammogram  11/6/2020 - breast MRI: BI-RADS 0  11/19/2020 - LEFT breast mammogram and US calcs tail left breast, enhancement different area on mri    Review of Systems   All other systems reviewed and are negative. Physical Exam  Vitals signs and nursing note reviewed. Chest:      Breasts: Breasts are symmetrical.         Right: No inverted nipple, mass, nipple discharge, skin change or tenderness. Left: No inverted nipple, mass, nipple discharge, skin change or tenderness. Lymphadenopathy:      Cervical: No cervical adenopathy. Upper Body:      Right upper body: No supraclavicular, axillary or pectoral adenopathy. Left upper body: No supraclavicular, axillary or pectoral adenopathy. ASSESSMENT and PLAN    ICD-10-CM ICD-9-CM    1.  Abnormal MRI, breast  R92.8 793.89       will review films with breast imager Monday  Then schedule for mri biopsy  Will assess if calcs need biopsy as well

## 2020-11-20 ENCOUNTER — OFFICE VISIT (OUTPATIENT)
Dept: SURGERY | Age: 60
End: 2020-11-20
Payer: COMMERCIAL

## 2020-11-20 ENCOUNTER — DOCUMENTATION ONLY (OUTPATIENT)
Dept: SURGERY | Age: 60
End: 2020-11-20

## 2020-11-20 VITALS
SYSTOLIC BLOOD PRESSURE: 147 MMHG | TEMPERATURE: 97.8 F | HEIGHT: 65 IN | HEART RATE: 76 BPM | WEIGHT: 232 LBS | BODY MASS INDEX: 38.65 KG/M2 | DIASTOLIC BLOOD PRESSURE: 76 MMHG

## 2020-11-20 DIAGNOSIS — R92.8 ABNORMAL MRI, BREAST: Primary | ICD-10-CM

## 2020-11-20 PROCEDURE — 99213 OFFICE O/P EST LOW 20 MIN: CPT | Performed by: SURGERY

## 2020-11-20 NOTE — PROGRESS NOTES
Dr. Tan Justice is holding on to pt's outside breast imaging CDs (mammo and mri from [facility]) so that she can review with breast imagers at [facility] next week.

## 2020-11-20 NOTE — LETTER
11/20/20 Patient: Romina Draper YOB: 1960 Date of Visit: 11/20/2020 Vonnie Vigil MD 
[facility] [address] [place] [address] VIA Facsimile: [phone] Dear Vonnie Vigil MD, Thank you for referring Ms. Amber Eller to [address] MAIN OFFICE [address] [place] [address] for evaluation. My notes for this consultation are attached. If you have questions, please do not hesitate to call me. I look forward to following your patient along with you. Sincerely, Sobeida Quiroga MD

## 2020-11-20 NOTE — PATIENT INSTRUCTIONS

## 2020-11-24 ENCOUNTER — TELEPHONE (OUTPATIENT)
Dept: MRI IMAGING | Age: 60
End: 2020-11-24

## 2020-11-24 ENCOUNTER — TELEPHONE (OUTPATIENT)
Dept: SURGERY | Age: 60
End: 2020-11-24

## 2020-11-24 DIAGNOSIS — R92.8 ABNORMAL MRI, BREAST: Primary | ICD-10-CM

## 2020-11-24 NOTE — TELEPHONE ENCOUNTER
Had dr. Nissa Wellington review breast mri and mammogram  calcs benign  Needs mri guided biopsy left breast  Will order   She was appreciative

## 2020-11-24 NOTE — TELEPHONE ENCOUNTER
Discussed biopsy order with pt, we will have to wait until Monday to schedule, but I did not want her to go all weekend without hearing from us. Informed pt I will call her back on Monday when I have all the information necessary to schedule her appropriately. Left my number if she desires to call me at any time. She seemed happy with this plan.

## 2020-12-03 DIAGNOSIS — E55.9 VITAMIN D DEFICIENCY: ICD-10-CM

## 2020-12-03 DIAGNOSIS — Z17.0 MALIGNANT NEOPLASM OF BREAST IN FEMALE, ESTROGEN RECEPTOR POSITIVE, UNSPECIFIED LATERALITY, UNSPECIFIED SITE OF BREAST (HCC): ICD-10-CM

## 2020-12-03 DIAGNOSIS — C50.919 MALIGNANT NEOPLASM OF BREAST IN FEMALE, ESTROGEN RECEPTOR POSITIVE, UNSPECIFIED LATERALITY, UNSPECIFIED SITE OF BREAST (HCC): ICD-10-CM

## 2020-12-08 RX ORDER — ERGOCALCIFEROL 1.25 MG/1
CAPSULE ORAL
Qty: 4 CAP | Refills: 0 | Status: SHIPPED | OUTPATIENT
Start: 2020-12-08 | End: 2021-01-15

## 2020-12-09 ENCOUNTER — HOSPITAL ENCOUNTER (OUTPATIENT)
Dept: MRI IMAGING | Age: 60
Discharge: HOME | End: 2020-12-09
Attending: SURGERY
Payer: COMMERCIAL

## 2020-12-09 ENCOUNTER — HOSPITAL ENCOUNTER (OUTPATIENT)
Dept: MAMMOGRAPHY | Age: 60
Discharge: HOME | End: 2020-12-09
Attending: SURGERY
Payer: COMMERCIAL

## 2020-12-09 VITALS — BODY MASS INDEX: 38.27 KG/M2 | WEIGHT: 230 LBS

## 2020-12-09 DIAGNOSIS — R92.8 ABNORMAL MRI, BREAST: ICD-10-CM

## 2020-12-09 PROCEDURE — A9585 GADOBUTROL INJECTION: HCPCS | Performed by: SURGERY

## 2020-12-09 PROCEDURE — 74011250636 HC RX REV CODE- 250/636: Performed by: SURGERY

## 2020-12-09 PROCEDURE — 88305 TISSUE EXAM BY PATHOLOGIST: CPT

## 2020-12-09 PROCEDURE — 74011000250 HC RX REV CODE- 250

## 2020-12-09 PROCEDURE — 74011000258 HC RX REV CODE- 258: Performed by: SURGERY

## 2020-12-09 PROCEDURE — 74011000250 HC RX REV CODE- 250: Performed by: SURGERY

## 2020-12-09 PROCEDURE — 77065 DX MAMMO INCL CAD UNI: CPT

## 2020-12-09 PROCEDURE — 19085 BX BREAST 1ST LESION MR IMAG: CPT

## 2020-12-09 RX ORDER — LIDOCAINE HYDROCHLORIDE 10 MG/ML
INJECTION, SOLUTION EPIDURAL; INFILTRATION; INTRACAUDAL; PERINEURAL
Status: COMPLETED
Start: 2020-12-09 | End: 2020-12-09

## 2020-12-09 RX ORDER — SODIUM CHLORIDE 0.9 % (FLUSH) 0.9 %
10 SYRINGE (ML) INJECTION ONCE
Status: COMPLETED | OUTPATIENT
Start: 2020-12-09 | End: 2020-12-09

## 2020-12-09 RX ADMIN — SODIUM BICARBONATE 10 MEQ: 84 INJECTION, SOLUTION INTRAVENOUS at 08:00

## 2020-12-09 RX ADMIN — SODIUM CHLORIDE 100 ML: 900 INJECTION, SOLUTION INTRAVENOUS at 08:00

## 2020-12-09 RX ADMIN — LIDOCAINE HYDROCHLORIDE 16 ML: 10 INJECTION, SOLUTION EPIDURAL; INFILTRATION; INTRACAUDAL; PERINEURAL at 08:00

## 2020-12-09 RX ADMIN — GADOBUTROL 10 ML: 604.72 INJECTION INTRAVENOUS at 08:00

## 2020-12-09 RX ADMIN — LIDOCAINE HYDROCHLORIDE,EPINEPHRINE BITARTRATE 240 MG: 10; .01 INJECTION, SOLUTION INFILTRATION; PERINEURAL at 08:00

## 2020-12-09 RX ADMIN — Medication 10 ML: at 08:00

## 2020-12-10 RX ORDER — SODIUM BICARBONATE 84 MG/ML
10 INJECTION, SOLUTION INTRAVENOUS ONCE
Status: COMPLETED | OUTPATIENT
Start: 2020-12-10 | End: 2020-12-09

## 2020-12-10 RX ORDER — LIDOCAINE HYDROCHLORIDE AND EPINEPHRINE 10; 10 MG/ML; UG/ML
24 INJECTION, SOLUTION INFILTRATION; PERINEURAL ONCE
Status: COMPLETED | OUTPATIENT
Start: 2020-12-10 | End: 2020-12-09

## 2020-12-10 RX ORDER — LIDOCAINE HYDROCHLORIDE 10 MG/ML
16 INJECTION, SOLUTION EPIDURAL; INFILTRATION; INTRACAUDAL; PERINEURAL ONCE
Status: COMPLETED | OUTPATIENT
Start: 2020-12-10 | End: 2020-12-09

## 2020-12-10 NOTE — PROGRESS NOTES
Patient tolerated left breast biopsy well with scant bleeding. Biopsy site was bandaged using steri strips, gauze and tegaderm, ice pack placed on site. Discharge instructions were reviewed with the patient who expresses understanding. She was provided with a written copy as well. Advised patient that results should be available by Friday, and that she will receive a phone call with these results. Encouraged her to call with any questions or concerns.

## 2021-01-13 DIAGNOSIS — C50.911 MALIGNANT NEOPLASM OF RIGHT BREAST IN FEMALE, ESTROGEN RECEPTOR POSITIVE, UNSPECIFIED SITE OF BREAST (HCC): ICD-10-CM

## 2021-01-13 DIAGNOSIS — Z17.0 MALIGNANT NEOPLASM OF RIGHT BREAST IN FEMALE, ESTROGEN RECEPTOR POSITIVE, UNSPECIFIED SITE OF BREAST (HCC): ICD-10-CM

## 2021-01-13 DIAGNOSIS — E55.9 VITAMIN D DEFICIENCY: ICD-10-CM

## 2021-01-20 ENCOUNTER — TELEPHONE (OUTPATIENT)
Dept: ONCOLOGY | Age: 61
End: 2021-01-20

## 2021-01-20 DIAGNOSIS — C50.911 MALIGNANT NEOPLASM OF RIGHT BREAST IN FEMALE, ESTROGEN RECEPTOR POSITIVE, UNSPECIFIED SITE OF BREAST (HCC): ICD-10-CM

## 2021-01-20 DIAGNOSIS — Z17.0 MALIGNANT NEOPLASM OF RIGHT BREAST IN FEMALE, ESTROGEN RECEPTOR POSITIVE, UNSPECIFIED SITE OF BREAST (HCC): ICD-10-CM

## 2021-01-20 DIAGNOSIS — E55.9 VITAMIN D DEFICIENCY: ICD-10-CM

## 2021-01-20 RX ORDER — LETROZOLE 2.5 MG/1
TABLET, FILM COATED ORAL
Qty: 30 TAB | Refills: 3 | Status: SHIPPED | OUTPATIENT
Start: 2021-01-20 | End: 2021-07-22

## 2021-01-20 NOTE — TELEPHONE ENCOUNTER
PER FERMÍN from Dr. Damon Dent LETROZOLE 2.5MG [facility]) ONE TAB ONCE A DAY QUANTITY 30 REFILL 3. Pt needs a 6 mo f/u from 9/2020 appt. So March 2021 please.

## 2021-02-02 RX ORDER — LETROZOLE 2.5 MG/1
TABLET, FILM COATED ORAL
Qty: 30 TAB | Refills: 0 | OUTPATIENT
Start: 2021-02-02

## 2021-03-16 NOTE — PROGRESS NOTES
Nohemi Lion is a 64 y.o. female here for 6 month virtual visit follow up for right breast cancer. She is on Letrozole. 1. Have you been to the ER, urgent care clinic since your last visit? Hospitalized since your last visit?  no    2. Have you seen or consulted any other health care providers outside of the [address] [place] [address] since your last visit? Include any pap smears or colon screening. no    Pt states she has been doing fine. No problems with Letrozole. She had COVID in January. Started as sore throat. Had fever and cough. Taste has not returned yet but she is doing good.

## 2021-03-19 ENCOUNTER — VIRTUAL VISIT (OUTPATIENT)
Dept: ONCOLOGY | Age: 61
End: 2021-03-19
Payer: COMMERCIAL

## 2021-03-19 DIAGNOSIS — Z17.0 MALIGNANT NEOPLASM OF BREAST IN FEMALE, ESTROGEN RECEPTOR POSITIVE, UNSPECIFIED LATERALITY, UNSPECIFIED SITE OF BREAST (HCC): Primary | ICD-10-CM

## 2021-03-19 DIAGNOSIS — C50.919 MALIGNANT NEOPLASM OF BREAST IN FEMALE, ESTROGEN RECEPTOR POSITIVE, UNSPECIFIED LATERALITY, UNSPECIFIED SITE OF BREAST (HCC): Primary | ICD-10-CM

## 2021-03-19 PROCEDURE — 99213 OFFICE O/P EST LOW 20 MIN: CPT | Performed by: INTERNAL MEDICINE

## 2021-03-19 NOTE — PROGRESS NOTES
[address], [address] [place] [address] Bhupendra Nunou, 200 [facility]  [phone]        Oncology Follow Up Note        Patient: Natalie Draper MRN: [id number]  SSN: [id number]    YOB: 1960  Age: 64 y.o. Sex: female        Reason for Visit:   Natalie Draper is a 64 y.o. female who is seen by synchronous (real-time) audio-video technology for follow up of right sided breast carcinoma      Diagnosis:     1. Right breast carcinoma:  T1b N0 M0 (Stage I) infiltrating ductal carcinoma, Tumor size two foci both 8 mm in size, LN -ve, grade 2, %, NE 50%, Her 2 -ve     Subjective: Natalie Draper is a 64 y.o. female who I am seeing in consultation for a new diagnosis of right sided invasive breast carcinoma. This was detected on screening mammogram, which led to diagnostic studies and a biopsy. The patient mentioned having LEFT nipple drainage in the past and RIGHT breast infection but that there was no palpable lump or specific problem prior to this screening mammogram in October. She was seen by Dr. Agnes Santiago. The biopsy revealed grade 2 IDC % NE 50%. She underwent a right sided simple mastectomy and there were two foci of disease, both less than 1 cm in size. She is doing well. She is taking letrozole and is asymptomatic.        Review of Systems:    Constitutional: negative  Eyes: negative  Ears, Nose, Mouth, Throat, and Face: negative  Respiratory: negative  Cardiovascular: negative  Gastrointestinal: negative  Genitourinary:negative  Integument/Breast: negative  Hematologic/Lymphatic: negative  Musculoskeletal:negative  Neurological: negative        Past Medical History:   Diagnosis Date    Breast cancer ([address] 2019    right    Cancer ([address] 11/2019    RIGHT breast    Diabetes ([address]     Hypercholesterolemia     Hypertension      Past Surgical History:   Procedure Laterality Date    HX BREAST BIOPSY Right 2019 idc    HX BREAST RECONSTRUCTION Right 1/29/2020    BREAST RECONSTRUCTION performed by Adal Galeas MD at [facility] MAIN OR    HX BREAST RECONSTRUCTION Right 3/13/2020    REMOVAL RIGHT BREAST TISSUE EXPANDER performed by Adal Galeas MD at [facility] MAIN OR    HX GYN      D&C    HX GYN      vaginal deliveries    HX GYN      BTL    HX MASTECTOMY Right 1/29/2020    RIGHT BREAST MASTECTOMY AND RIGHT BREAST SENTINEL NODE BIOPSY, RIGHT BREAST RECONSTRUCTION WITIH TISSUE EXPANDER AND ACELLULAR DERMAL MATRIX performed by Ana Burger MD at [facility] MAIN OR    NEUROLOGICAL PROCEDURE UNLISTED Bilateral     CTR      Family History   Problem Relation Age of Onset    Lung Cancer Mother     Cancer Mother         Lung Cancer    Hypertension Mother     Cancer Father         leukemia    No Known Problems Sister     No Known Problems Brother     No Known Problems Maternal Grandmother     No Known Problems Maternal Grandfather     No Known Problems Paternal Grandmother     No Known Problems Paternal Grandfather     Cancer Brother         Prostate     Social History     Tobacco Use    Smoking status: Never Smoker    Smokeless tobacco: Never Used   Substance Use Topics    Alcohol use: No      Prior to Admission medications    Medication Sig Start Date End Date Taking? Authorizing Provider   letrozole [facility]) 2.5 mg tablet Take 1 tablet by mouth once daily 1/20/21  Yes Quique Morales MD   ergocalciferol (ERGOCALCIFEROL) 1,250 mcg (50,000 unit) capsule Take 1 capsule by mouth once a week 1/15/21  Yes Antonia Rodriguez, NP   atorvastatin (LIPITOR) 20 mg tablet TAKE 1 TABLET BY MOUTH ONCE DAILY 11/18/19  Yes Provider, Historical   irbesartan (AVAPRO) 150 mg tablet Take 150 mg by mouth daily. Yes Provider, Historical   metFORMIN ER (GLUCOPHAGE XR) 500 mg tablet Take 500 mg by mouth daily. 2/21/19  Yes Provider, Historical   amLODIPine (NORVASC) 10 mg tablet daily.  11/1/18  Yes Provider, Historical              No Known Allergies        Objective:     General: alert, cooperative, no distress   Mental  status: normal mood, behavior, speech, dress, motor activity, and thought processes, able to follow commands   HENT: NCAT   Neck: no visualized mass   Resp: no respiratory distress   Neuro: no gross deficits   Skin: no discoloration or lesions of concern on visible areas   Psychiatric: normal affect, consistent with stated mood, no evidence of hallucinations       Due to this being a TeleHealth evaluation ([facility]), many elements of the physical examination are unable to be assessed. Evaluation of the following organ systems was limited: Vitals/Constitutional/EENT/Resp/CV/GI//MS/Neuro/Skin/Heme-Lymph-Imm. Assessment:     1. Right breast carcinoma:  T1b N0 M0 (Stage I) infiltrating ductal carcinoma, Tumor size two foci both 8 mm in size, LN -ve, grade 2, %, PA 50%, Her 2 -ve      ECOG PS 0  Intent of Treatment - curative  Prognosis - excellent    S/P right breast simple mastectomy 01/29/2020  Taking Letrozole  Tolerating treatment very well  Denies any side effects. In remission      Plan:       1. Continue Letrozole  2. Return in 6 months      Signed By: Mayra Liu MD     March 19, 2021         CC. Woodrow Robles MD  CC. Jose Eduardo David MD      I was in the office while conducting this encounter. The patient was at her home. Consent:  She and/or her healthcare decision maker is aware that this patient-initiated Telehealth encounter is a billable service, with coverage as determined by her insurance carrier.  She is aware that she may receive a bill and has provided verbal consent to proceed: Yes    Pursuant to the emergency declaration under the [address] and the [facility], 1135 waiver authority and the [employer] and [employer] Act, this Virtual  Visit was conducted, with patient's (and/or legal guardian's) consent, to reduce the patient's risk of exposure to COVID-19 and provide necessary medical care. Services were provided through a video synchronous discussion virtually to substitute for in-person visit.

## 2021-04-02 ENCOUNTER — DOCUMENTATION ONLY (OUTPATIENT)
Dept: SURGERY | Age: 61
End: 2021-04-02

## 2021-04-02 NOTE — PROGRESS NOTES
Mammogram and breast MRI films from [address] [place] [address] loaded to PACs. Placed in shred box to be destroyed.

## 2021-07-09 DIAGNOSIS — Z17.0 MALIGNANT NEOPLASM OF BREAST IN FEMALE, ESTROGEN RECEPTOR POSITIVE, UNSPECIFIED LATERALITY, UNSPECIFIED SITE OF BREAST (HCC): ICD-10-CM

## 2021-07-09 DIAGNOSIS — C50.919 MALIGNANT NEOPLASM OF BREAST IN FEMALE, ESTROGEN RECEPTOR POSITIVE, UNSPECIFIED LATERALITY, UNSPECIFIED SITE OF BREAST (HCC): ICD-10-CM

## 2021-07-09 DIAGNOSIS — E55.9 VITAMIN D DEFICIENCY: ICD-10-CM

## 2021-07-09 RX ORDER — ERGOCALCIFEROL 1.25 MG/1
CAPSULE ORAL
Qty: 4 CAPSULE | Refills: 0 | Status: SHIPPED | OUTPATIENT
Start: 2021-07-09 | End: 2021-09-09

## 2021-07-22 DIAGNOSIS — C50.911 MALIGNANT NEOPLASM OF RIGHT BREAST IN FEMALE, ESTROGEN RECEPTOR POSITIVE, UNSPECIFIED SITE OF BREAST (HCC): ICD-10-CM

## 2021-07-22 DIAGNOSIS — E55.9 VITAMIN D DEFICIENCY: ICD-10-CM

## 2021-07-22 DIAGNOSIS — Z17.0 MALIGNANT NEOPLASM OF RIGHT BREAST IN FEMALE, ESTROGEN RECEPTOR POSITIVE, UNSPECIFIED SITE OF BREAST (HCC): ICD-10-CM

## 2021-07-22 RX ORDER — LETROZOLE 2.5 MG/1
TABLET, FILM COATED ORAL
Qty: 30 TABLET | Refills: 0 | Status: SHIPPED | OUTPATIENT
Start: 2021-07-22 | End: 2021-09-09

## 2021-08-25 NOTE — PERIOP NOTES
[facility]  Preoperative Instructions        Surgery Date 9/1/21          Time of Arrival 0545    1. On the day of your surgery, please report to the Surgical Services Registration Desk and sign in at your designated time. The Surgery Center is located to the right of the Emergency Room. 2. You must have someone with you to drive you home. You should not drive a car for 24 hours following surgery. Please make arrangements for a friend or family member to stay with you for the first 24 hours after your surgery. 3. Do not have anything to eat or drink (including water, gum, mints, coffee, juice) after midnight ?8/31/21? Kenton Taylor ? This may not apply to medications prescribed by your physician. ?(Please note below the special instructions with medications to take the morning of your procedure.)    4. We recommend you do not drink any alcoholic beverages for 24 hours before and after your surgery. 5. Contact your surgeons office for instructions on the following medications: non-steroidal anti-inflammatory drugs (i.e. Advil, Aleve), vitamins, and supplements. (Some surgeons will want you to stop these medications prior to surgery and others may allow you to take them)  **If you are currently taking Plavix, Coumadin, Aspirin and/or other blood-thinning agents, contact your surgeon for instructions. ** Your surgeon will partner with the physician prescribing these medications to determine if it is safe to stop or if you need to continue taking. Please do not stop taking these medications without instructions from your surgeon    6. Wear comfortable clothes. Wear glasses instead of contacts. Do not bring any money or jewelry. Please bring picture ID, insurance card, and any prearranged co-payment or hospital payment. Do not wear make-up, particularly mascara the morning of your surgery. Do not wear nail polish, particularly if you are having foot /hand surgery.   Wear your hair loose or down, no ponytails, buns, paul pins or clips. All body piercings must be removed. Please shower with antibacterial soap for three consecutive days before and on the morning of surgery, but do not apply any lotions, powders or deodorants after the shower on the day of surgery. Please use a fresh towels after each shower. Please sleep in clean clothes and change bed linens the night before surgery. Please do not shave for 48 hours prior to surgery. Shaving of the face is acceptable. 7. You should understand that if you do not follow these instructions your surgery may be cancelled. If your physical condition changes (I.e. fever, cold or flu) please contact your surgeon as soon as possible. 8. It is important that you be on time. If a situation occurs where you may be late, please call [phone] (OR Holding Area). 9. If you have any questions and or problems, please call [phone] (Pre-admission Testing). 10. Your surgery time may be subject to change. You will receive a phone call the evening prior if your time changes. 11.  If having outpatient surgery, you must have someone to drive you here, stay with you during the duration of your stay, and to drive you home at time of discharge. Special Instructions:     TAKE ALL MEDICATIONS DAY OF SURGERY EXCEPT:  Irbesartan, metformin    I understand a pre-operative phone call will be made to verify my surgery time. In the event that I am not available, I give permission for a message to be left on my answering service and/or with another person?   Yes [phone]         ___________________      __________   _________    (Signature of Patient)             (Witness)                (Date and Time)

## 2021-08-31 ENCOUNTER — ANESTHESIA EVENT (OUTPATIENT)
Dept: SURGERY | Age: 61
End: 2021-08-31
Payer: COMMERCIAL

## 2021-08-31 NOTE — ANESTHESIA PREPROCEDURE EVALUATION
Anesthetic History   No history of anesthetic complications            Review of Systems / Medical History  Patient summary reviewed, nursing notes reviewed and pertinent labs reviewed    Pulmonary  Within defined limits                 Neuro/Psych   Within defined limits           Cardiovascular    Hypertension          Hyperlipidemia    Exercise tolerance: >4 METS  Comments: ECG (1/22/20):  Normal sinus rhythm   No previous ECGs available    GI/Hepatic/Renal  Within defined limits              Endo/Other    Diabetes: type 2    Obesity, morbid obesity and cancer    Comments: Right Breast Cancer s/p right mastectomy and reconstruction (1/29/20) Other Findings              Physical Exam    Airway  Mallampati: II  TM Distance: > 6 cm  Neck ROM: normal range of motion   Mouth opening: Normal     Cardiovascular    Rhythm: regular  Rate: normal      Pertinent negatives: No murmur   Dental    Dentition: Poor dentition  Comments: Multiple missing teeth, remaining teeth in poor condition   Pulmonary  Breath sounds clear to auscultation               Abdominal  GI exam deferred       Other Findings            Anesthetic Plan    ASA: 3  Anesthesia type: general    Monitoring Plan: BIS      Induction: Intravenous  Anesthetic plan and risks discussed with: Patient

## 2021-09-01 ENCOUNTER — HOSPITAL ENCOUNTER (OUTPATIENT)
Age: 61
Setting detail: OUTPATIENT SURGERY
Discharge: HOME | End: 2021-09-01
Attending: PLASTIC SURGERY | Admitting: PLASTIC SURGERY
Payer: COMMERCIAL

## 2021-09-01 ENCOUNTER — ANESTHESIA (OUTPATIENT)
Dept: SURGERY | Age: 61
End: 2021-09-01
Payer: COMMERCIAL

## 2021-09-01 VITALS
RESPIRATION RATE: 19 BRPM | WEIGHT: 230.82 LBS | TEMPERATURE: 97.1 F | SYSTOLIC BLOOD PRESSURE: 145 MMHG | BODY MASS INDEX: 38.46 KG/M2 | OXYGEN SATURATION: 98 % | DIASTOLIC BLOOD PRESSURE: 93 MMHG | HEART RATE: 97 BPM | HEIGHT: 65 IN

## 2021-09-01 DIAGNOSIS — C50.911 MALIGNANT NEOPLASM OF RIGHT BREAST IN FEMALE, ESTROGEN RECEPTOR POSITIVE, UNSPECIFIED SITE OF BREAST (HCC): Primary | ICD-10-CM

## 2021-09-01 DIAGNOSIS — Z17.0 MALIGNANT NEOPLASM OF RIGHT BREAST IN FEMALE, ESTROGEN RECEPTOR POSITIVE, UNSPECIFIED SITE OF BREAST (HCC): Primary | ICD-10-CM

## 2021-09-01 LAB
GLUCOSE BLD STRIP.AUTO-MCNC: 110 MG/DL (ref 65–117)
GLUCOSE BLD STRIP.AUTO-MCNC: 130 MG/DL (ref 65–117)
SERVICE CMNT-IMP: ABNORMAL
SERVICE CMNT-IMP: NORMAL

## 2021-09-01 PROCEDURE — 77030026438 HC STYL ET INTUB CARD -A: Performed by: NURSE ANESTHETIST, CERTIFIED REGISTERED

## 2021-09-01 PROCEDURE — 77030002986 HC SUT PROL J&J -A: Performed by: PLASTIC SURGERY

## 2021-09-01 PROCEDURE — 77030008684 HC TU ET CUF COVD -B: Performed by: NURSE ANESTHETIST, CERTIFIED REGISTERED

## 2021-09-01 PROCEDURE — 74011250637 HC RX REV CODE- 250/637: Performed by: PLASTIC SURGERY

## 2021-09-01 PROCEDURE — 74011250636 HC RX REV CODE- 250/636: Performed by: PLASTIC SURGERY

## 2021-09-01 PROCEDURE — 77030002933 HC SUT MCRYL J&J -A: Performed by: PLASTIC SURGERY

## 2021-09-01 PROCEDURE — 74011000250 HC RX REV CODE- 250: Performed by: PLASTIC SURGERY

## 2021-09-01 PROCEDURE — 77030040361 HC SLV COMPR DVT MDII -B: Performed by: PLASTIC SURGERY

## 2021-09-01 PROCEDURE — 74011000250 HC RX REV CODE- 250: Performed by: NURSE ANESTHETIST, CERTIFIED REGISTERED

## 2021-09-01 PROCEDURE — 76210000020 HC REC RM PH II FIRST 0.5 HR: Performed by: PLASTIC SURGERY

## 2021-09-01 PROCEDURE — 76210000016 HC OR PH I REC 1 TO 1.5 HR: Performed by: PLASTIC SURGERY

## 2021-09-01 PROCEDURE — 77030019908 HC STETH ESOPH SIMS -A: Performed by: ANESTHESIOLOGY

## 2021-09-01 PROCEDURE — 77030002916 HC SUT ETHLN J&J -A: Performed by: PLASTIC SURGERY

## 2021-09-01 PROCEDURE — 74011250636 HC RX REV CODE- 250/636: Performed by: ANESTHESIOLOGY

## 2021-09-01 PROCEDURE — 74011250636 HC RX REV CODE- 250/636: Performed by: NURSE ANESTHETIST, CERTIFIED REGISTERED

## 2021-09-01 PROCEDURE — 77030039266 HC ADH SKN EXOFIN S2SG -A: Performed by: PLASTIC SURGERY

## 2021-09-01 PROCEDURE — 77030013079 HC BLNKT BAIR HGGR 3M -A: Performed by: ANESTHESIOLOGY

## 2021-09-01 PROCEDURE — 2709999900 HC NON-CHARGEABLE SUPPLY: Performed by: PLASTIC SURGERY

## 2021-09-01 PROCEDURE — 77030038692 HC WND DEB SYS IRMX -B: Performed by: PLASTIC SURGERY

## 2021-09-01 PROCEDURE — 76060000034 HC ANESTHESIA 1.5 TO 2 HR: Performed by: PLASTIC SURGERY

## 2021-09-01 PROCEDURE — C1789 PROSTHESIS, BREAST, IMP: HCPCS | Performed by: PLASTIC SURGERY

## 2021-09-01 PROCEDURE — 82962 GLUCOSE BLOOD TEST: CPT

## 2021-09-01 PROCEDURE — 76010000153 HC OR TIME 1.5 TO 2 HR: Performed by: PLASTIC SURGERY

## 2021-09-01 PROCEDURE — 77030011264 HC ELECTRD BLD EXT COVD -A: Performed by: PLASTIC SURGERY

## 2021-09-01 PROCEDURE — 77030008462 HC STPLR SKN PROX J&J -A: Performed by: PLASTIC SURGERY

## 2021-09-01 PROCEDURE — 77030014369: Performed by: PLASTIC SURGERY

## 2021-09-01 DEVICE — SMOOTH, HIGH PROFILE, SUTURE TABS, INTEGRAL INJECTION DOME, 750CC
Type: IMPLANTABLE DEVICE | Site: BREAST | Status: FUNCTIONAL
Brand: ARTOURA BREAST TISSUE EXPANDER

## 2021-09-01 RX ORDER — DEXMEDETOMIDINE HYDROCHLORIDE 100 UG/ML
INJECTION, SOLUTION INTRAVENOUS AS NEEDED
Status: DISCONTINUED | OUTPATIENT
Start: 2021-09-01 | End: 2021-09-01 | Stop reason: HOSPADM

## 2021-09-01 RX ORDER — HYDROCODONE BITARTRATE AND ACETAMINOPHEN 5; 325 MG/1; MG/1
1 TABLET ORAL ONCE
Status: COMPLETED | OUTPATIENT
Start: 2021-09-01 | End: 2021-09-01

## 2021-09-01 RX ORDER — PROPOFOL 10 MG/ML
INJECTION, EMULSION INTRAVENOUS AS NEEDED
Status: DISCONTINUED | OUTPATIENT
Start: 2021-09-01 | End: 2021-09-01 | Stop reason: HOSPADM

## 2021-09-01 RX ORDER — SODIUM CHLORIDE 0.9 % (FLUSH) 0.9 %
5-40 SYRINGE (ML) INJECTION AS NEEDED
Status: DISCONTINUED | OUTPATIENT
Start: 2021-09-01 | End: 2021-09-01 | Stop reason: HOSPADM

## 2021-09-01 RX ORDER — KETAMINE HYDROCHLORIDE 10 MG/ML
INJECTION, SOLUTION INTRAMUSCULAR; INTRAVENOUS AS NEEDED
Status: DISCONTINUED | OUTPATIENT
Start: 2021-09-01 | End: 2021-09-01 | Stop reason: HOSPADM

## 2021-09-01 RX ORDER — SODIUM CHLORIDE 0.9 % (FLUSH) 0.9 %
5-40 SYRINGE (ML) INJECTION EVERY 8 HOURS
Status: DISCONTINUED | OUTPATIENT
Start: 2021-09-01 | End: 2021-09-01 | Stop reason: HOSPADM

## 2021-09-01 RX ORDER — FENTANYL CITRATE 50 UG/ML
INJECTION, SOLUTION INTRAMUSCULAR; INTRAVENOUS AS NEEDED
Status: DISCONTINUED | OUTPATIENT
Start: 2021-09-01 | End: 2021-09-01 | Stop reason: HOSPADM

## 2021-09-01 RX ORDER — SODIUM CHLORIDE, SODIUM LACTATE, POTASSIUM CHLORIDE, CALCIUM CHLORIDE 600; 310; 30; 20 MG/100ML; MG/100ML; MG/100ML; MG/100ML
25 INJECTION, SOLUTION INTRAVENOUS CONTINUOUS
Status: DISCONTINUED | OUTPATIENT
Start: 2021-09-01 | End: 2021-09-01 | Stop reason: HOSPADM

## 2021-09-01 RX ORDER — HYDROMORPHONE HYDROCHLORIDE 1 MG/ML
0.2 INJECTION, SOLUTION INTRAMUSCULAR; INTRAVENOUS; SUBCUTANEOUS
Status: DISCONTINUED | OUTPATIENT
Start: 2021-09-01 | End: 2021-09-01 | Stop reason: HOSPADM

## 2021-09-01 RX ORDER — ONDANSETRON 2 MG/ML
INJECTION INTRAMUSCULAR; INTRAVENOUS AS NEEDED
Status: DISCONTINUED | OUTPATIENT
Start: 2021-09-01 | End: 2021-09-01 | Stop reason: HOSPADM

## 2021-09-01 RX ORDER — DIPHENHYDRAMINE HYDROCHLORIDE 50 MG/ML
12.5 INJECTION, SOLUTION INTRAMUSCULAR; INTRAVENOUS AS NEEDED
Status: DISCONTINUED | OUTPATIENT
Start: 2021-09-01 | End: 2021-09-01 | Stop reason: HOSPADM

## 2021-09-01 RX ORDER — BUPIVACAINE HYDROCHLORIDE AND EPINEPHRINE 2.5; 5 MG/ML; UG/ML
INJECTION, SOLUTION EPIDURAL; INFILTRATION; INTRACAUDAL; PERINEURAL AS NEEDED
Status: DISCONTINUED | OUTPATIENT
Start: 2021-09-01 | End: 2021-09-01 | Stop reason: HOSPADM

## 2021-09-01 RX ORDER — LIDOCAINE HYDROCHLORIDE 10 MG/ML
0.1 INJECTION, SOLUTION EPIDURAL; INFILTRATION; INTRACAUDAL; PERINEURAL AS NEEDED
Status: DISCONTINUED | OUTPATIENT
Start: 2021-09-01 | End: 2021-09-01 | Stop reason: HOSPADM

## 2021-09-01 RX ORDER — FENTANYL CITRATE 50 UG/ML
25 INJECTION, SOLUTION INTRAMUSCULAR; INTRAVENOUS
Status: DISCONTINUED | OUTPATIENT
Start: 2021-09-01 | End: 2021-09-01 | Stop reason: HOSPADM

## 2021-09-01 RX ORDER — HYDROCODONE BITARTRATE AND ACETAMINOPHEN 5; 325 MG/1; MG/1
1 TABLET ORAL
Qty: 20 TABLET | Refills: 0 | Status: SHIPPED | OUTPATIENT
Start: 2021-09-01 | End: 2021-09-06

## 2021-09-01 RX ORDER — MIDAZOLAM HYDROCHLORIDE 1 MG/ML
INJECTION, SOLUTION INTRAMUSCULAR; INTRAVENOUS AS NEEDED
Status: DISCONTINUED | OUTPATIENT
Start: 2021-09-01 | End: 2021-09-01 | Stop reason: HOSPADM

## 2021-09-01 RX ORDER — EPHEDRINE SULFATE/0.9% NACL/PF 50 MG/5 ML
SYRINGE (ML) INTRAVENOUS AS NEEDED
Status: DISCONTINUED | OUTPATIENT
Start: 2021-09-01 | End: 2021-09-01 | Stop reason: HOSPADM

## 2021-09-01 RX ORDER — LIDOCAINE HYDROCHLORIDE 20 MG/ML
INJECTION, SOLUTION EPIDURAL; INFILTRATION; INTRACAUDAL; PERINEURAL AS NEEDED
Status: DISCONTINUED | OUTPATIENT
Start: 2021-09-01 | End: 2021-09-01 | Stop reason: HOSPADM

## 2021-09-01 RX ORDER — SUCCINYLCHOLINE CHLORIDE 20 MG/ML
INJECTION INTRAMUSCULAR; INTRAVENOUS AS NEEDED
Status: DISCONTINUED | OUTPATIENT
Start: 2021-09-01 | End: 2021-09-01 | Stop reason: HOSPADM

## 2021-09-01 RX ORDER — DEXAMETHASONE SODIUM PHOSPHATE 4 MG/ML
INJECTION, SOLUTION INTRA-ARTICULAR; INTRALESIONAL; INTRAMUSCULAR; INTRAVENOUS; SOFT TISSUE AS NEEDED
Status: DISCONTINUED | OUTPATIENT
Start: 2021-09-01 | End: 2021-09-01 | Stop reason: HOSPADM

## 2021-09-01 RX ADMIN — Medication 3 AMPULE: at 07:15

## 2021-09-01 RX ADMIN — WATER 2 G: 1 INJECTION INTRAMUSCULAR; INTRAVENOUS; SUBCUTANEOUS at 07:43

## 2021-09-01 RX ADMIN — FENTANYL CITRATE 100 MCG: 50 INJECTION, SOLUTION INTRAMUSCULAR; INTRAVENOUS at 07:36

## 2021-09-01 RX ADMIN — SODIUM CHLORIDE, POTASSIUM CHLORIDE, SODIUM LACTATE AND CALCIUM CHLORIDE 25 ML/HR: 600; 310; 30; 20 INJECTION, SOLUTION INTRAVENOUS at 07:15

## 2021-09-01 RX ADMIN — DEXMEDETOMIDINE HYDROCHLORIDE 4 MCG: 100 INJECTION, SOLUTION, CONCENTRATE INTRAVENOUS at 08:05

## 2021-09-01 RX ADMIN — DEXMEDETOMIDINE HYDROCHLORIDE 4 MCG: 100 INJECTION, SOLUTION, CONCENTRATE INTRAVENOUS at 08:12

## 2021-09-01 RX ADMIN — DEXAMETHASONE SODIUM PHOSPHATE 8 MG: 4 INJECTION, SOLUTION INTRAMUSCULAR; INTRAVENOUS at 07:45

## 2021-09-01 RX ADMIN — SUCCINYLCHOLINE CHLORIDE 180 MG: 20 INJECTION, SOLUTION INTRAMUSCULAR; INTRAVENOUS at 07:36

## 2021-09-01 RX ADMIN — ONDANSETRON HYDROCHLORIDE 4 MG: 2 INJECTION, SOLUTION INTRAMUSCULAR; INTRAVENOUS at 08:46

## 2021-09-01 RX ADMIN — Medication 10 MG: at 07:59

## 2021-09-01 RX ADMIN — Medication 10 MG: at 07:54

## 2021-09-01 RX ADMIN — PROPOFOL 30 MG: 10 INJECTION, EMULSION INTRAVENOUS at 08:40

## 2021-09-01 RX ADMIN — PROPOFOL 150 MG: 10 INJECTION, EMULSION INTRAVENOUS at 07:36

## 2021-09-01 RX ADMIN — FENTANYL CITRATE 25 MCG: 50 INJECTION, SOLUTION INTRAMUSCULAR; INTRAVENOUS at 09:31

## 2021-09-01 RX ADMIN — KETAMINE HYDROCHLORIDE 30 MG: 10 INJECTION, SOLUTION INTRAMUSCULAR; INTRAVENOUS at 07:51

## 2021-09-01 RX ADMIN — DEXMEDETOMIDINE HYDROCHLORIDE 4 MCG: 100 INJECTION, SOLUTION, CONCENTRATE INTRAVENOUS at 08:40

## 2021-09-01 RX ADMIN — FENTANYL CITRATE 25 MCG: 50 INJECTION, SOLUTION INTRAMUSCULAR; INTRAVENOUS at 09:35

## 2021-09-01 RX ADMIN — HYDROCODONE BITARTRATE AND ACETAMINOPHEN 1 TABLET: 5; 325 TABLET ORAL at 10:20

## 2021-09-01 RX ADMIN — LIDOCAINE HYDROCHLORIDE 100 MG: 20 INJECTION, SOLUTION INTRAVENOUS at 07:36

## 2021-09-01 RX ADMIN — MIDAZOLAM HYDROCHLORIDE 2 MG: 1 INJECTION, SOLUTION INTRAMUSCULAR; INTRAVENOUS at 07:29

## 2021-09-01 NOTE — PERIOP NOTES
No recent covid test result, Denies S/S. +vaccinated  Mepilex to Sacrum, no redness, tenderness or signs of breakdown.   Belongings to PACU, 1 bag  Daughter in waiting room or via cell

## 2021-09-01 NOTE — BRIEF OP NOTE
Brief Postoperative Note    Patient: Farhana Lilly  YOB: 1960  MRN: [id number]    Date of Procedure: 9/1/2021     Pre-Op Diagnosis: BREAST CANCER    Post-Op Diagnosis: Same as preoperative diagnosis. Procedure(s):  RIGHT BREAST RECONSTRUCTION WITH TISSUE EXPANDER (750ml filled with 250ml)    Surgeon(s):  Elvera Siemens, MD    Surgical Assistant: Surg Asst-1: Joann Bolton    Anesthesia: General     Estimated Blood Loss (mL): less than 50     Complications: None    Specimens: * No specimens in log *     Implants:   Implant Name Type Inv.  Item Serial No.  Lot No. LRB No. Used Action   EXPANDER BRST TISS 750CC [id number] P7.7CM REFUGIO SMOOTH HI - [id number]  EXPANDER BRST TISS 750CC [id number] P7.7CM REFUGIO SMOOTH HI [id number] [employer] [id number] Right 1 Implanted       Drains:   Michael-Cohn Drain 09/01/21 Right Breast (Active)       Findings: see note    Electronically Signed by Deon Weiner MD on 9/1/2021 at 9:08 AM

## 2021-09-01 NOTE — ANESTHESIA POSTPROCEDURE EVALUATION
Procedure(s):  RIGHT BREAST RECONSTRUCTION WITH TISSUE EXPANDER. general    Anesthesia Post Evaluation        Patient location during evaluation: PACU  Note status: Adequate. Level of consciousness: responsive to verbal stimuli and sleepy but conscious  Pain management: satisfactory to patient  Airway patency: patent  Anesthetic complications: no  Cardiovascular status: acceptable  Respiratory status: acceptable  Hydration status: acceptable  Comments: +Post-Anesthesia Evaluation and Assessment    Patient: Irina Guerin MRN: [id number]  SSN: [id number]   YOB: 1960  Age: 63 y.o. Sex: female      Cardiovascular Function/Vital Signs    BP (!) 159/77   Pulse 89   Temp 36.2 °C (97.1 °F)   Resp 17   Ht 5' 5\" (1.651 m)   Wt 104.7 kg (230 lb 13.2 oz)   SpO2 99%   BMI 38.41 kg/m²     Patient is status post Procedure(s):  RIGHT BREAST RECONSTRUCTION WITH TISSUE EXPANDER. Nausea/Vomiting: Controlled. Postoperative hydration reviewed and adequate. Pain:  Pain Scale 1: Numeric (0 - 10) (09/01/21 1000)  Pain Intensity 1: 4 (09/01/21 1000)   Managed. Neurological Status:   Neuro (WDL): Exceptions to WDL (09/01/21 0907)   At baseline. Mental Status and Level of Consciousness: Arousable. Pulmonary Status:   O2 Device: None (Room air) (09/01/21 0920)   Adequate oxygenation and airway patent. Complications related to anesthesia: None    Post-anesthesia assessment completed. No concerns. Signed By: Russell Carrizales MD    9/1/2021  Post anesthesia nausea and vomiting:  controlled      INITIAL Post-op Vital signs:   Vitals Value Taken Time   /97 09/01/21 1015   Temp 36.2 °C (97.1 °F) 09/01/21 0907   Pulse 93 09/01/21 1017   Resp 16 09/01/21 1017   SpO2 98 % 09/01/21 1017   Vitals shown include unvalidated device data.

## 2021-09-01 NOTE — OP NOTES
[address]  OPERATIVE REPORT    Name:  Hima Valentin  MR#:  [id number]  :  1960  ACCOUNT #:  [de-identified]  DATE OF SERVICE:  2021    PREOPERATIVE DIAGNOSES:  1. Personal history of right breast cancer. 2.  Acquired absence of right breast.    POSTOPERATIVE DIAGNOSES:  1. Personal history of right breast cancer. 2.  Acquired absence of right breast.    PROCEDURE PERFORMED:  Delayed right breast reconstruction, dual plane, with tissue expander ([employer] 750 mL high-profile [place] filled with 250 mL). SURGEON:  Doug Padilla MD    ASSISTANT:  Dio Vidal.    ANESTHESIA:  General.    COMPLICATIONS:  None. SPECIMENS REMOVED:  None. IMPLANTS:  As above. ESTIMATED BLOOD LOSS:  Less than 20 mL. INDICATIONS:  This 51-year-old female presented with history of right breast cancer. She had previously undergone a skin-sparing mastectomy and immediate prepectoral expander reconstruction that was complicated by recurrent seroma, infection, and explant. She was ready to resume reconstruction. She was felt to be an acceptable candidate for a dual plane expander/implant reconstruction. She was aware of the risks, including reconstructive failure and implant loss. She agreed to proceed. PROCEDURE:  After informed consent was obtained, she was marked preoperatively in the holding area. She was taken to the operating room, where general anesthetic was given. The right chest was infiltrated with 0.25% bupivacaine with epinephrine, and then prepped and draped. A gently curving inframammary incision was made extending from the breast meridian laterally. Sharp dissection revealed the lateral edge of the pectoralis major muscle, and the subpectoral space was entered. The pectoralis attachments were completely released from the sternum to the lateral border of the implant dimensions.   Dissection continued in the subcutaneous plane to the inframammary fold, thereby creating a dual plane type space. Dissection continued superiorly to the clavicle. Meticulous hemostasis was obtained. Anchoring sutures were placed at 3 and 6 o'clock using zero Prolene. The pocket was irrigated with saline and Betadine. A 750 mL expander was deflated, and inserted and secured via the suture tabs at the anchoring points. A 7-mm drain was inserted and brought out inferolaterally. The expander was filled with 250 mL saline. Irrigation was performed with dilute chlorhexidine followed by saline and Betadine, and the final closure was performed in layers with absorbable suture. Dermabond and dry dressings were applied. She tolerated the procedure well and was transferred to the recovery room in good condition after extubation.         MD KAITLYN [email or web address]  D:  09/01/2021 11:12  T:  09/01/2021 14:01  JOB #:  [id number]

## 2021-09-01 NOTE — PERIOP NOTES
For dc home. Vswnl. Reports pain as 4/10 which is tolerable per pt. Denies nausea. dsgs to R breast d&i. Went over [employer] instructions w/pt and daughter including drain care, Rx, and f/up. Verbalized understanding. dc'd home.

## 2021-09-01 NOTE — PERIOP NOTES
Handoff Report from Operating Room to PACU    Report received from Leticia Red RN and Pati Bills CRNA regarding Delma Kirkpatrick. Surgeon(s):  Gale Harmon MD  And Procedure(s) (LRB):  RIGHT BREAST RECONSTRUCTION WITH TISSUE EXPANDER (Right)  confirmed   with dressings discussed. Anesthesia type, drugs, patient history, complications, estimated blood loss, vital signs, intake and output, and last pain medication, lines and temperature were reviewed.

## 2021-09-01 NOTE — PERIOP NOTES
Irrisept Wound Debridement and Cleansing System  Ref: [id number] Reeves: [id number] LOT: [id number] Expiration Date: 07/31/2023

## 2021-09-01 NOTE — DISCHARGE INSTRUCTIONS
Empty and record drain output twice daily or as needed. May remove bra and dressings in 48hrs and shower and get incisions wet. Wear sports bra unless showering. No heavy lifting or vigorous activity. DISCHARGE SUMMARY from Nurse    PATIENT INSTRUCTIONS:    After general anesthesia or intravenous sedation, for 24 hours or while taking prescription Narcotics:  · Limit your activities  · Do not drive and operate hazardous machinery  · Do not make important personal or business decisions  · Do  not drink alcoholic beverages  · If you have not urinated within 8 hours after discharge, please contact your surgeon on call. Report the following to your surgeon:  · Excessive pain, swelling, redness or odor of or around the surgical area  · Temperature over 100.5  · Nausea and vomiting lasting longer than 4 hours or if unable to take medications  · Any signs of decreased circulation or nerve impairment to extremity: change in color, persistent  numbness, tingling, coldness or increase pain  · Any questions    What to do at Home:    *  Please give a list of your current medications to your Primary Care Provider. *  Please update this list whenever your medications are discontinued, doses are      changed, or new medications (including over-the-counter products) are added. *  Please carry medication information at all times in case of emergency situations. These are general instructions for a healthy lifestyle:    No smoking/ No tobacco products/ Avoid exposure to second hand smoke  Surgeon General's Warning:  Quitting smoking now greatly reduces serious risk to your health.     Obesity, smoking, and sedentary lifestyle greatly increases your risk for illness    A healthy diet, regular physical exercise & weight monitoring are important for maintaining a healthy lifestyle    You may be retaining fluid if you have a history of heart failure or if you experience any of the following symptoms:  Weight gain of 3 pounds or more overnight or 5 pounds in a week, increased swelling in our hands or feet or shortness of breath while lying flat in bed. Please call your doctor as soon as you notice any of these symptoms; do not wait until your next office visit. The discharge information has been reviewed with the patient and caregiver. The patient and caregiver verbalized understanding. Discharge medications reviewed with the patient and caregiver and appropriate educational materials and side effects teaching were provided. ___________________________________________________________________________________________________________________________________    A common side effect of anesthesia following surgery is nausea and/or vomiting. In order to decrease symptoms, it is wise to avoid foods that are high in fat, greasy foods, milk products, and spicy foods for the first 24 hours. Acceptable foods for the first 24 hours following surgery include but are not limited to:     soup   broth    toast    crackers    applesauce    bananas    mashed potatoes,   soft or scrambled eggs   oatmeal    jello    It is important to eat when taking your pain medication. This will help to prevent nausea. If possible, please try to time your meals with your medications. It is very important to stay hydrated following surgery. Sip fluids frequently while awake. Avoid acidic drinks such as citrus juices and soda for 24 hours. Carbonated beverages may cause bloating and gas. Acceptable fluids include:    - water (flavor packets may add variety)  - coffee or tea (in moderation)  - Gatorade  - [place]-aid  - apple juice  - cranberry juice    You are encouraged to cough and deep breathe every hour when awake. This will help to prevent respiratory complications following anesthesia.  You may want to hug a pillow when coughing and sneezing to add additional support to the surgical area and to decrease discomfort if you had abdominal or chest surgery. If you are discharged home with support stockings, you may remove them after 24 hours. Support stockings are used to help prevent blood clots in the legs following surgery. Please take time to review all of your Home Care Instructions and Medication Information sheets provided in your discharge packet. If you have any questions, please contact your surgeons office. Thank you. How to Care for Your Wound After Its Treated With  DERMABOND* Topical Skin Adhesive  DERMABOND* Topical Skin Adhesive (2-octyl cyanoacrylate) is a sterile, liquid skin adhesive  that holds wound edges together. The film will usually remain in place for 5 to 10 days, then  naturally fall off your skin. The following will answer some of your questions and provide instructions for proper care for your  wound while it is healing:    CHECK WOUND APPEARANCE   Some swelling, redness, and pain are common with all wounds and normally will go away as the  wound heals. If swelling, redness, or pain increases or if the wound feels warm to the touch,  contact a doctor. Also contact a doctor if the wound edges reopen or separate. REPLACE BANDAGES   If your wound is bandaged, keep the bandage dry.  Replace the dressing daily until the adhesive film has fallen off or if the  bandage should become wet, unless otherwise instructed by your  physician.  When changing the dressing, do not place tape directly over the  DERMABOND adhesive film, because removing the tape later may also  remove the film. AVOID TOPICAL MEDICATIONS   Do not apply liquid or ointment medications or any other product to your wound while the  DERMABOND adhesive film is in place. These may loosen the film before your wound is healed. KEEP WOUND DRY AND PROTECTED   You may occasionally and briefly wet your wound in the shower or bath.  Do not soak or scrub  your wound, do not swim, and avoid periods of heavy perspiration until the DERMABOND  adhesive has naturally fallen off. After showering or bathing, gently blot your wound dry with a  soft towel. If a protective dressing is being used, apply a fresh, dry bandage, being sure to keep  the tape off the DERMABOND adhesive film.  Apply a clean, dry bandage over the wound if necessary to protect it.  Protect your wound from injury until the skin has had sufficient time to heal.   Do not scratch, rub, or pick at the DERMABOND adhesive film. This may loosen the film before  your wound is healed.  Protect the wound from prolonged exposure to sunlight or tanning lamps while the film is in  place. If you have any questions or concerns about this product, please consult your doctor. *Trademark ©[employer] 2002    Patient Education   Narcotic-Analgesic/Acetaminophen (By mouth)   Relieves pain. Brand Name(s): Capital w/Codeine, [place], [place], [place], Lorcet HD, Lorcet Plus, Lortab 10/325, Lortab 5/325, Lortab 7.5/325, Lortab Elixir, [place], [place], [place], Trezix, Tylenol With Codeine No. 4   There may be other brand names for this medicine. When This Medicine Should Not Be Used: You should not use this medicine if you have had an allergic reaction to acetaminophen, codeine, hydrocodone, propoxyphene, or sulfites. You should not use this medicine if you have had an allergic reaction to any other opioid pain medicine. How to Use This Medicine:   Liquid, Tablet, Capsule  · Your doctor will tell you how much medicine to use. Do not use more than directed. · This medicine contains acetaminophen. Read the labels of all other medicines you are using to see if they also contain acetaminophen, or ask your doctor or pharmacist. Juancarlos Dose not use more than 4 grams (4,000 milligrams) total of acetaminophen in one day. · Drink plenty of liquids to help avoid constipation. If a dose is missed:   · Some of these medicines need to be used on a fixed schedule.  If you miss a dose or forget to use your medicine, call your doctor pharmacist for instructions. Do not use extra medicine to make up for a missed dose. How to Store and Dispose of This Medicine:   · Store the medicine in a closed container at room temperature, away from heat, moisture, and direct light. Do not refrigerate or freeze the medicine. · Ask your pharmacist, doctor, or health caregiver about the best way to dispose of any outdated medicine or medicine no longer needed. · Keep all medicine out of the reach of children. Never share your medicine with anyone. Drugs and Foods to Avoid:   Ask your doctor or pharmacist before using any other medicine, including over-the-counter medicines, vitamins, and herbal products. · Make sure your doctor knows if you are using a monoamine oxidase inhibitor (MAOI) medicine, such as Eldepryl®, Marplan®, [place], or Parnate®. Make sure your doctor knows if you are also using a medicine to treat depression, such as amitriptyline, doxepin, nortriptyline, Elavil®, Pamelor®, or Sinequan®. Make sure your doctor knows if you are taking an anticholinergic medicine, such as atropine, methscopolamine, or scopolamine. · Tell your doctor if you use anything else that makes you sleepy. Some examples are allergy medicine, narcotic pain medicine, and alcohol. · Do not drink alcohol while you are using this medicine. Warnings While Using This Medicine:   · Make sure your doctor knows if you are pregnant or breast feeding, or if you have a head injury, or other conditions that may cause an increase in intercranial pressure (pressure inside your head). Make sure your doctor knows if you have severe kidney problems or severe liver problems, or if you have hypothyroidism (lack of thyroid function). Make sure your doctor knows if you have Raymond's disease (adrenal gland disease), or if you have enlarged prostate or urethral stricture.  Make sure your doctor knows if you have any abdominal problems, or if you have lung disease or asthma. · This medicine may make you dizzy or drowsy. Avoid driving, using machines, or anything else that could be dangerous if you are not alert. · This medicine can be habit-forming. Do not use more than your prescribed dose. Call your doctor if you think your medicine is not working. · Tell any doctor or dentist who treats you that you are using this medicine. This medicine may affect certain medical test results. · This medicine may cause constipation, especially with long-term use. Ask your doctor if you should use a laxative to prevent and treat constipation. · When a mother is breastfeeding and takes codeine, there is a very small chance that this medicine could cause serious side effects in the baby. This is because codeine works differently in a few women, so their breast milk contains too much medicine. If you take codeine, be alert for these signs of overdose in your nursing baby: sleeping more than usual, trouble breastfeeding, trouble breathing, or being limp and weak. Call the baby's doctor right away if you think there is a problem. If you cannot talk to the doctor, take the baby to the emergency room or call 911. Possible Side Effects While Using This Medicine:   Call your doctor right away if you notice any of these side effects:  · Allergic reaction: Itching or hives, swelling in your face or hands, swelling or tingling in your mouth or throat, chest tightness, trouble breathing  · Dizziness. · Seeing or hearing things that are not there. · Very slow heartbeat. If you notice these less serious side effects, talk with your doctor:   · Change in how much or how often you urinate. · Cold, clammy skin. · Feeling unusually anxious, excited, fearful, or tired. · Nausea, vomiting, constipation, stomach pain or upset, or heartburn. · Skin rash. · Vision changes. If you notice other side effects that you think are caused by this medicine, tell your doctor.    Call your doctor for medical advice about side effects. You may report side effects to FDA at 4-145-PDX-1397  © 2017 [facility] Information is for End User's use only and may not be sold, redistributed or otherwise used for commercial purposes. The above information is an  only. It is not intended as medical advice for individual conditions or treatments. Talk to your doctor, nurse or pharmacist before following any medical regimen to see if it is safe and effective for you.

## 2021-09-08 DIAGNOSIS — E55.9 VITAMIN D DEFICIENCY: ICD-10-CM

## 2021-09-08 DIAGNOSIS — C50.911 MALIGNANT NEOPLASM OF RIGHT BREAST IN FEMALE, ESTROGEN RECEPTOR POSITIVE, UNSPECIFIED SITE OF BREAST (HCC): ICD-10-CM

## 2021-09-08 DIAGNOSIS — Z17.0 MALIGNANT NEOPLASM OF BREAST IN FEMALE, ESTROGEN RECEPTOR POSITIVE, UNSPECIFIED LATERALITY, UNSPECIFIED SITE OF BREAST (HCC): ICD-10-CM

## 2021-09-08 DIAGNOSIS — C50.919 MALIGNANT NEOPLASM OF BREAST IN FEMALE, ESTROGEN RECEPTOR POSITIVE, UNSPECIFIED LATERALITY, UNSPECIFIED SITE OF BREAST (HCC): ICD-10-CM

## 2021-09-08 DIAGNOSIS — Z17.0 MALIGNANT NEOPLASM OF RIGHT BREAST IN FEMALE, ESTROGEN RECEPTOR POSITIVE, UNSPECIFIED SITE OF BREAST (HCC): ICD-10-CM

## 2021-09-09 RX ORDER — ERGOCALCIFEROL 1.25 MG/1
CAPSULE ORAL
Qty: 4 CAPSULE | Refills: 0 | Status: SHIPPED | OUTPATIENT
Start: 2021-09-09 | End: 2021-10-13

## 2021-09-09 RX ORDER — LETROZOLE 2.5 MG/1
TABLET, FILM COATED ORAL
Qty: 30 TABLET | Refills: 0 | Status: SHIPPED | OUTPATIENT
Start: 2021-09-09 | End: 2021-11-01

## 2021-09-09 NOTE — TELEPHONE ENCOUNTER
VORB per provider, refill approved.    Requested Prescriptions   Pending Prescriptions Disp Refills    ergocalciferol (ERGOCALCIFEROL) 1,250 mcg (50,000 unit) capsule [Pharmacy Med Name: VITAMIN D2 (ERGO) 1.25MG  CAP] 4 Capsule 0     Sig: Take 1 capsule by mouth once a week

## 2021-09-09 NOTE — TELEPHONE ENCOUNTER
VORB per provider, refill approved.    Requested Prescriptions   Pending Prescriptions Disp Refills    letrozole (FEMARA) 2.5 mg tablet [Pharmacy Med Name: Letrozole 2.5 MG Oral Tablet] 30 Tablet 0     Sig: Take 1 tablet by mouth once daily

## 2021-10-08 NOTE — PROGRESS NOTES
Meek Gant is a 63 y.o. female here for 6 month follow up for right breast cancer. She is on Letrozole. 1. Have you been to the ER, urgent care clinic since your last visit? Hospitalized since your last visit? 9/1/21 RIGHT BREAST RECONSTRUCTION WITH TISSUE EXPANDER    2. Have you seen or consulted any other health care providers outside of the [address] [place] [address] since your last visit? Include any pap smears or colon screening. Pt states she has been doing well. No concerns brought up.

## 2021-10-11 ENCOUNTER — OFFICE VISIT (OUTPATIENT)
Dept: ONCOLOGY | Age: 61
End: 2021-10-11
Payer: COMMERCIAL

## 2021-10-11 VITALS
OXYGEN SATURATION: 97 % | BODY MASS INDEX: 38.59 KG/M2 | SYSTOLIC BLOOD PRESSURE: 142 MMHG | HEIGHT: 65 IN | TEMPERATURE: 98.2 F | HEART RATE: 87 BPM | WEIGHT: 231.6 LBS | DIASTOLIC BLOOD PRESSURE: 81 MMHG

## 2021-10-11 DIAGNOSIS — Z17.0 MALIGNANT NEOPLASM OF RIGHT BREAST IN FEMALE, ESTROGEN RECEPTOR POSITIVE, UNSPECIFIED SITE OF BREAST (HCC): Primary | ICD-10-CM

## 2021-10-11 DIAGNOSIS — C50.911 MALIGNANT NEOPLASM OF RIGHT BREAST IN FEMALE, ESTROGEN RECEPTOR POSITIVE, UNSPECIFIED SITE OF BREAST (HCC): Primary | ICD-10-CM

## 2021-10-11 PROCEDURE — 99213 OFFICE O/P EST LOW 20 MIN: CPT | Performed by: INTERNAL MEDICINE

## 2021-10-11 NOTE — PROGRESS NOTES
[address] [facility] [address] \[facility] [place] [facility]\"", [address] [place] [address]  [phone]          Oncology Note        Patient: Dominik Dodge MRN: [id number]  SSN: [id number]    YOB: 1960  Age: 63 y.o. Sex: female        Diagnosis:     1. Right breast carcinoma:  T1b N0 M0 (Stage I) infiltrating ductal carcinoma, Tumor size two foci both 8 mm in size, LN -ve, grade 2, %, DE 50%, Her 2 -ve       Treatment:     1. S/P right breast simple mastectomy 01/29/2020  2. Letrozole      Subjective: Dominik Dodge is a 63 y.o. female who I am seeing in consultation for a new diagnosis of right sided invasive breast carcinoma. This was detected on screening mammogram, which led to diagnostic studies and a biopsy. The patient mentioned having LEFT nipple drainage in the past and RIGHT breast infection but that there was no palpable lump or specific problem prior to this screening mammogram in October. She was seen by Dr. Fernando Bender. The biopsy revealed grade 2 IDC % DE 50%. She underwent a right sided simple mastectomy and there were two foci of disease, both less than 1 cm in size. She is doing well after surgery. She is doing well. Taking Letrozole and denies any symptoms.          Review of Systems:    Constitutional: negative  Eyes: negative  Ears, Nose, Mouth, Throat, and Face: negative  Respiratory: negative  Cardiovascular: negative  Gastrointestinal: negative  Genitourinary:negative  Integument/Breast: negative  Hematologic/Lymphatic: negative  Musculoskeletal:negative  Neurological: negative        Past Medical History:   Diagnosis Date    Breast cancer ([address] 2019    right    Cancer ([address] 11/2019    RIGHT breast    Diabetes ([address]     Hypercholesterolemia     Hypertension      Past Surgical History:   Procedure Laterality Date    HX BREAST BIOPSY Right 2019    idc    HX BREAST RECONSTRUCTION Right 1/29/2020    BREAST RECONSTRUCTION performed by Jacklyn Gong MD at [facility] MAIN OR    HX BREAST RECONSTRUCTION Right 3/13/2020    REMOVAL RIGHT BREAST TISSUE EXPANDER performed by Jacklyn Gong MD at [facility] MA[facility] OR    HX BREAST RECONSTRUCTION Right 9/1/2021    RIGHT BREAST RECONSTRUCTION WITH TISSUE EXPANDER performed by Jacklyn Gong MD at [facility] MAIN OR    HX GYN      D&C    HX GYN      vaginal deliveries    HX GYN      BTL    HX MASTECTOMY Right 1/29/2020    RIGHT BREAST MASTECTOMY AND RIGHT BREAST SENTINEL NODE BIOPSY, RIGHT BREAST RECONSTRUCTION WITIH TISSUE EXPANDER AND ACELLULAR DERMAL MATRIX performed by Kacie Marley MD at [facility] MAIN OR    HX ORTHOPAEDIC Bilateral     carpal tunnel release    NEUROLOGICAL PROCEDURE UNLISTED Bilateral     CTR      Family History   Problem Relation Age of Onset    Lung Cancer Mother     Cancer Mother         Lung Cancer    Hypertension Mother     Cancer Father         leukemia    No Known Problems Sister     No Known Problems Brother     No Known Problems Maternal Grandmother     No Known Problems Maternal Grandfather     No Known Problems Paternal Grandmother     No Known Problems Paternal Grandfather     Cancer Brother         Prostate     Social History     Tobacco Use    Smoking status: Never Smoker    Smokeless tobacco: Never Used   Substance Use Topics    Alcohol use: No      Prior to Admission medications    Medication Sig Start Date End Date Taking? Authorizing Provider   ergocalciferol (ERGOCALCIFEROL) 1,250 mcg (50,000 unit) capsule Take 1 capsule by mouth once a week 9/9/21  Yes Fede ESCALERA NP   letrozole [facility]) 2.5 mg tablet Take 1 tablet by mouth once daily 9/9/21  Yes Savanah Huddleston MD   atorvastatin (LIPITOR) 20 mg tablet TAKE 1 TABLET BY MOUTH ONCE DAILY 11/18/19  Yes Provider, Historical   irbesartan (AVAPRO) 150 mg tablet Take 150 mg by mouth daily.    Yes Provider, Historical   metFORMIN ER (GLUCOPHAGE XR) 500 mg tablet Take 500 mg by mouth daily. 2/21/19  Yes Provider, Historical   amLODIPine (NORVASC) 10 mg tablet daily. 11/1/18  Yes Provider, Historical              No Known Allergies        Objective:     Vitals:    10/11/21 1334   BP: (!) 142/81   Pulse: 87   Temp: 98.2 °F (36.8 °C)   TempSrc: Temporal   SpO2: 97%   Weight: 231 lb 9.6 oz (105.1 kg)   Height: 5' 5\" (1.651 m)            Physical Exam:    GENERAL: alert, cooperative, no distress, appears stated age  EYE: conjunctivae/corneas clear. PERRL, EOM's intact  LYMPHATIC: Cervical, supraclavicular, and axillary nodes normal.   THROAT & NECK: normal and no erythema or exudates noted. LUNG: clear to auscultation bilaterally  HEART: regular rate and rhythm, S1, S2 normal, no murmur, click, rub or gallop  ABDOMEN: soft, non-tender. Bowel sounds normal. No masses,  no organomegaly  EXTREMITIES:  extremities normal, atraumatic, no cyanosis or edema  SKIN: Normal.  NEUROLOGIC: AOx3. Gait normal. Reflexes and motor strength normal and symmetric. Cranial nerves 2-12 and sensation grossly intact. Assessment:     1. Right breast carcinoma:  T1b N0 M0 (Stage I) infiltrating ductal carcinoma, Tumor size two foci both 8 mm in size, LN -ve, grade 2, %, MT 50%, Her 2 -ve      ECOG PS 0  Intent of Treatment - curative  Prognosis - excellent    S/P right breast simple mastectomy 01/29/2020  On Letrozole. Tolerating well. No side effects      Plan:       1. Continue Letrozole  2. Return in 6 months  3. DEXA      Signed By: Laura Lewis MD     October 11, 2021         CC. Janene Reese MD  CC.  Jania Jeronimo MD

## 2021-10-11 NOTE — LETTER
10/11/2021    Patient: Alesia Severin   YOB: 1960   Date of Visit: 10/11/2021     Lucio Puga MD  [address] [facility] [id number]  Via Fax: [phone]    Dear Lucio Puga MD,      Thank you for referring Ms. Misbah Sarabia to [address] [place] [address] for evaluation. My notes for this consultation are attached. If you have questions, please do not hesitate to call me. I look forward to following your patient along with you.       Sincerely,    Roger Michel MD

## 2021-10-13 DIAGNOSIS — Z17.0 MALIGNANT NEOPLASM OF BREAST IN FEMALE, ESTROGEN RECEPTOR POSITIVE, UNSPECIFIED LATERALITY, UNSPECIFIED SITE OF BREAST (HCC): ICD-10-CM

## 2021-10-13 DIAGNOSIS — E55.9 VITAMIN D DEFICIENCY: ICD-10-CM

## 2021-10-13 DIAGNOSIS — C50.919 MALIGNANT NEOPLASM OF BREAST IN FEMALE, ESTROGEN RECEPTOR POSITIVE, UNSPECIFIED LATERALITY, UNSPECIFIED SITE OF BREAST (HCC): ICD-10-CM

## 2021-10-13 RX ORDER — ERGOCALCIFEROL 1.25 MG/1
CAPSULE ORAL
Qty: 4 CAPSULE | Refills: 0 | Status: SHIPPED | OUTPATIENT
Start: 2021-10-13 | End: 2021-11-30

## 2021-11-18 ENCOUNTER — HOSPITAL ENCOUNTER (OUTPATIENT)
Dept: MAMMOGRAPHY | Age: 61
Discharge: HOME | End: 2021-11-18
Attending: INTERNAL MEDICINE
Payer: COMMERCIAL

## 2021-11-18 DIAGNOSIS — C50.911 MALIGNANT NEOPLASM OF RIGHT BREAST IN FEMALE, ESTROGEN RECEPTOR POSITIVE, UNSPECIFIED SITE OF BREAST (HCC): ICD-10-CM

## 2021-11-18 DIAGNOSIS — Z17.0 MALIGNANT NEOPLASM OF RIGHT BREAST IN FEMALE, ESTROGEN RECEPTOR POSITIVE, UNSPECIFIED SITE OF BREAST (HCC): ICD-10-CM

## 2021-11-18 PROCEDURE — 77080 DXA BONE DENSITY AXIAL: CPT

## 2021-11-30 DIAGNOSIS — Z17.0 MALIGNANT NEOPLASM OF BREAST IN FEMALE, ESTROGEN RECEPTOR POSITIVE, UNSPECIFIED LATERALITY, UNSPECIFIED SITE OF BREAST (HCC): ICD-10-CM

## 2021-11-30 DIAGNOSIS — E55.9 VITAMIN D DEFICIENCY: ICD-10-CM

## 2021-11-30 DIAGNOSIS — C50.919 MALIGNANT NEOPLASM OF BREAST IN FEMALE, ESTROGEN RECEPTOR POSITIVE, UNSPECIFIED LATERALITY, UNSPECIFIED SITE OF BREAST (HCC): ICD-10-CM

## 2021-11-30 RX ORDER — ERGOCALCIFEROL 1.25 MG/1
CAPSULE ORAL
Qty: 4 CAPSULE | Refills: 0 | Status: SHIPPED | OUTPATIENT
Start: 2021-11-30 | End: 2022-01-03

## 2022-01-01 DIAGNOSIS — Z17.0 MALIGNANT NEOPLASM OF BREAST IN FEMALE, ESTROGEN RECEPTOR POSITIVE, UNSPECIFIED LATERALITY, UNSPECIFIED SITE OF BREAST (HCC): ICD-10-CM

## 2022-01-01 DIAGNOSIS — C50.919 MALIGNANT NEOPLASM OF BREAST IN FEMALE, ESTROGEN RECEPTOR POSITIVE, UNSPECIFIED LATERALITY, UNSPECIFIED SITE OF BREAST (HCC): ICD-10-CM

## 2022-01-01 DIAGNOSIS — E55.9 VITAMIN D DEFICIENCY: ICD-10-CM

## 2022-01-03 RX ORDER — ERGOCALCIFEROL 1.25 MG/1
CAPSULE ORAL
Qty: 4 CAPSULE | Refills: 0 | Status: SHIPPED | OUTPATIENT
Start: 2022-01-03 | End: 2022-03-11

## 2022-01-11 RX ORDER — SODIUM CHLORIDE, SODIUM LACTATE, POTASSIUM CHLORIDE, CALCIUM CHLORIDE 600; 310; 30; 20 MG/100ML; MG/100ML; MG/100ML; MG/100ML
25 INJECTION, SOLUTION INTRAVENOUS CONTINUOUS
Status: CANCELLED | OUTPATIENT
Start: 2022-01-11

## 2022-01-11 NOTE — PERIOP NOTES
[facility]  Preoperative Instructions        Surgery Date 1/19/22         Time of Arrival TBC    1. On the day of your surgery, please report to the Surgical Services Registration Desk and sign in at your designated time. The Surgery Center is located to the right of the Emergency Room. 2. You must have someone with you to drive you home. You should not drive a car for 24 hours following surgery. Please make arrangements for a friend or family member to stay with you for the first 24 hours after your surgery. 3. Do not have anything to eat or drink (including water, gum, mints, coffee, juice) after midnight 1/18/22 . ? This may not apply to medications prescribed by your physician. ?(Please note below the special instructions with medications to take the morning of your procedure.)    4. We recommend you do not drink any alcoholic beverages for 24 hours before and after your surgery. 5. Contact your surgeons office for instructions on the following medications: non-steroidal anti-inflammatory drugs (i.e. Advil, Aleve), vitamins, and supplements. (Some surgeons will want you to stop these medications prior to surgery and others may allow you to take them)  **If you are currently taking Plavix, Coumadin, Aspirin and/or other blood-thinning agents, contact your surgeon for instructions. ** Your surgeon will partner with the physician prescribing these medications to determine if it is safe to stop or if you need to continue taking. Please do not stop taking these medications without instructions from your surgeon    6. Wear comfortable clothes. Wear glasses instead of contacts. Do not bring any money or jewelry. Please bring picture ID, insurance card, and any prearranged co-payment or hospital payment. Do not wear make-up, particularly mascara the morning of your surgery. Do not wear nail polish, particularly if you are having foot /hand surgery.   Wear your hair loose or down, no ponytails, buns, paul pins or clips. All body piercings must be removed. Please shower with antibacterial soap for three consecutive days before and on the morning of surgery, but do not apply any lotions, powders or deodorants after the shower on the day of surgery. Please use a fresh towels after each shower. Please sleep in clean clothes and change bed linens the night before surgery. Please do not shave for 48 hours prior to surgery. Shaving of the face is acceptable. 7. You should understand that if you do not follow these instructions your surgery may be cancelled. If your physical condition changes (I.e. fever, cold or flu) please contact your surgeon as soon as possible. 8. It is important that you be on time. If a situation occurs where you may be late, please call [phone] (OR Holding Area). 9. If you have any questions and or problems, please call [phone] (Pre-admission Testing). 10. Your surgery time may be subject to change. You will receive a phone call the evening prior if your time changes. 11.  If having outpatient surgery, you must have someone to drive you here, stay with you during the duration of your stay, and to drive you home at time of discharge. Special Instructions:     TAKE ALL MEDICATIONS DAY OF SURGERY EXCEPT: Avapro and Metformin      I understand a pre-operative phone call will be made to verify my surgery time. In the event that I am not available, I give permission for a message to be left on my answering service and/or with another person?  yes         ___________________      __________   _________    (Signature of Patient)             (Witness)                (Date and Time)

## 2022-01-14 ENCOUNTER — HOSPITAL ENCOUNTER (OUTPATIENT)
Dept: PREADMISSION TESTING | Age: 62
Discharge: HOME | End: 2022-01-14
Payer: COMMERCIAL

## 2022-01-14 LAB
SARS-COV-2, XPLCVT: NOT DETECTED
SOURCE, COVRS: NORMAL

## 2022-01-14 PROCEDURE — U0005 INFEC AGEN DETEC AMPLI PROBE: HCPCS

## 2022-01-19 ENCOUNTER — ANESTHESIA (OUTPATIENT)
Dept: SURGERY | Age: 62
End: 2022-01-19
Payer: COMMERCIAL

## 2022-01-19 ENCOUNTER — HOSPITAL ENCOUNTER (OUTPATIENT)
Age: 62
Setting detail: OUTPATIENT SURGERY
Discharge: HOME | End: 2022-01-19
Attending: PLASTIC SURGERY | Admitting: PLASTIC SURGERY
Payer: COMMERCIAL

## 2022-01-19 ENCOUNTER — ANESTHESIA EVENT (OUTPATIENT)
Dept: SURGERY | Age: 62
End: 2022-01-19
Payer: COMMERCIAL

## 2022-01-19 VITALS
HEIGHT: 65 IN | DIASTOLIC BLOOD PRESSURE: 76 MMHG | RESPIRATION RATE: 14 BRPM | BODY MASS INDEX: 38.9 KG/M2 | OXYGEN SATURATION: 95 % | WEIGHT: 233.47 LBS | HEART RATE: 98 BPM | SYSTOLIC BLOOD PRESSURE: 161 MMHG | TEMPERATURE: 97.8 F

## 2022-01-19 DIAGNOSIS — Z17.0 MALIGNANT NEOPLASM OF RIGHT BREAST IN FEMALE, ESTROGEN RECEPTOR POSITIVE, UNSPECIFIED SITE OF BREAST (HCC): Primary | ICD-10-CM

## 2022-01-19 DIAGNOSIS — C50.911 MALIGNANT NEOPLASM OF RIGHT BREAST IN FEMALE, ESTROGEN RECEPTOR POSITIVE, UNSPECIFIED SITE OF BREAST (HCC): Primary | ICD-10-CM

## 2022-01-19 LAB
GLUCOSE BLD STRIP.AUTO-MCNC: 101 MG/DL (ref 65–117)
GLUCOSE BLD STRIP.AUTO-MCNC: 120 MG/DL (ref 65–117)
SERVICE CMNT-IMP: ABNORMAL
SERVICE CMNT-IMP: NORMAL

## 2022-01-19 PROCEDURE — 77030026438 HC STYL ET INTUB CARD -A: Performed by: ANESTHESIOLOGY

## 2022-01-19 PROCEDURE — 76210000020 HC REC RM PH II FIRST 0.5 HR: Performed by: PLASTIC SURGERY

## 2022-01-19 PROCEDURE — 77030008462 HC STPLR SKN PROX J&J -A: Performed by: PLASTIC SURGERY

## 2022-01-19 PROCEDURE — 77030040361 HC SLV COMPR DVT MDII -B: Performed by: PLASTIC SURGERY

## 2022-01-19 PROCEDURE — 77030008684 HC TU ET CUF COVD -B: Performed by: ANESTHESIOLOGY

## 2022-01-19 PROCEDURE — 77030011264 HC ELECTRD BLD EXT COVD -A: Performed by: PLASTIC SURGERY

## 2022-01-19 PROCEDURE — 74011000250 HC RX REV CODE- 250: Performed by: STUDENT IN AN ORGANIZED HEALTH CARE EDUCATION/TRAINING PROGRAM

## 2022-01-19 PROCEDURE — 74011250636 HC RX REV CODE- 250/636: Performed by: NURSE ANESTHETIST, CERTIFIED REGISTERED

## 2022-01-19 PROCEDURE — 77030002933 HC SUT MCRYL J&J -A: Performed by: PLASTIC SURGERY

## 2022-01-19 PROCEDURE — 77030008459 HC STPLR SKN COOP -B: Performed by: PLASTIC SURGERY

## 2022-01-19 PROCEDURE — 74011000250 HC RX REV CODE- 250: Performed by: NURSE ANESTHETIST, CERTIFIED REGISTERED

## 2022-01-19 PROCEDURE — 77030002916 HC SUT ETHLN J&J -A: Performed by: PLASTIC SURGERY

## 2022-01-19 PROCEDURE — 77030008538 HC TBNG LIPO SUC WELL -B: Performed by: PLASTIC SURGERY

## 2022-01-19 PROCEDURE — 82962 GLUCOSE BLOOD TEST: CPT

## 2022-01-19 PROCEDURE — 77030031139 HC SUT VCRL2 J&J -A: Performed by: PLASTIC SURGERY

## 2022-01-19 PROCEDURE — 74011250636 HC RX REV CODE- 250/636: Performed by: ANESTHESIOLOGY

## 2022-01-19 PROCEDURE — 77030013567 HC DRN WND RESERV BARD -A: Performed by: PLASTIC SURGERY

## 2022-01-19 PROCEDURE — 77030026227 HC FUNL KELLR 2 PCH ALGN -C: Performed by: PLASTIC SURGERY

## 2022-01-19 PROCEDURE — 76210000016 HC OR PH I REC 1 TO 1.5 HR: Performed by: PLASTIC SURGERY

## 2022-01-19 PROCEDURE — 74011000250 HC RX REV CODE- 250: Performed by: PLASTIC SURGERY

## 2022-01-19 PROCEDURE — 74011250636 HC RX REV CODE- 250/636: Performed by: PLASTIC SURGERY

## 2022-01-19 PROCEDURE — 77030002966 HC SUT PDS J&J -A: Performed by: PLASTIC SURGERY

## 2022-01-19 PROCEDURE — 77030019908 HC STETH ESOPH SIMS -A: Performed by: ANESTHESIOLOGY

## 2022-01-19 PROCEDURE — 77030013358: Performed by: PLASTIC SURGERY

## 2022-01-19 PROCEDURE — 2709999900 HC NON-CHARGEABLE SUPPLY: Performed by: PLASTIC SURGERY

## 2022-01-19 PROCEDURE — 88305 TISSUE EXAM BY PATHOLOGIST: CPT

## 2022-01-19 PROCEDURE — 85018 HEMOGLOBIN: CPT

## 2022-01-19 PROCEDURE — 77030038692 HC WND DEB SYS IRMX -B: Performed by: PLASTIC SURGERY

## 2022-01-19 PROCEDURE — 76060000036 HC ANESTHESIA 2.5 TO 3 HR: Performed by: PLASTIC SURGERY

## 2022-01-19 PROCEDURE — 74011250637 HC RX REV CODE- 250/637: Performed by: PLASTIC SURGERY

## 2022-01-19 PROCEDURE — 76010000132 HC OR TIME 2.5 TO 3 HR: Performed by: PLASTIC SURGERY

## 2022-01-19 PROCEDURE — C1789 PROSTHESIS, BREAST, IMP: HCPCS | Performed by: PLASTIC SURGERY

## 2022-01-19 DEVICE — SMOOTH ROUND MODERATE PLUS PROFILE GEL-FILLED BREAST IMPLANT, 800CC  SMOOTH ROUND SILICONE
Type: IMPLANTABLE DEVICE | Site: BREAST | Status: FUNCTIONAL
Brand: MENTOR MEMORYGEL BREAST IMPLANT

## 2022-01-19 RX ORDER — SUCCINYLCHOLINE CHLORIDE 20 MG/ML
INJECTION INTRAMUSCULAR; INTRAVENOUS AS NEEDED
Status: DISCONTINUED | OUTPATIENT
Start: 2022-01-19 | End: 2022-01-19 | Stop reason: HOSPADM

## 2022-01-19 RX ORDER — LABETALOL HYDROCHLORIDE 5 MG/ML
10 INJECTION, SOLUTION INTRAVENOUS
Status: COMPLETED | OUTPATIENT
Start: 2022-01-19 | End: 2022-01-19

## 2022-01-19 RX ORDER — FENTANYL CITRATE 50 UG/ML
25 INJECTION, SOLUTION INTRAMUSCULAR; INTRAVENOUS
Status: DISCONTINUED | OUTPATIENT
Start: 2022-01-19 | End: 2022-01-19 | Stop reason: HOSPADM

## 2022-01-19 RX ORDER — PHENYLEPHRINE HCL IN 0.9% NACL 0.4MG/10ML
SYRINGE (ML) INTRAVENOUS AS NEEDED
Status: DISCONTINUED | OUTPATIENT
Start: 2022-01-19 | End: 2022-01-19 | Stop reason: HOSPADM

## 2022-01-19 RX ORDER — MIDAZOLAM HYDROCHLORIDE 1 MG/ML
INJECTION, SOLUTION INTRAMUSCULAR; INTRAVENOUS AS NEEDED
Status: DISCONTINUED | OUTPATIENT
Start: 2022-01-19 | End: 2022-01-19 | Stop reason: HOSPADM

## 2022-01-19 RX ORDER — HYDROMORPHONE HYDROCHLORIDE 1 MG/ML
0.2 INJECTION, SOLUTION INTRAMUSCULAR; INTRAVENOUS; SUBCUTANEOUS
Status: DISCONTINUED | OUTPATIENT
Start: 2022-01-19 | End: 2022-01-19 | Stop reason: HOSPADM

## 2022-01-19 RX ORDER — HYDROCODONE BITARTRATE AND ACETAMINOPHEN 5; 325 MG/1; MG/1
1 TABLET ORAL
Qty: 30 TABLET | Refills: 0 | Status: SHIPPED | OUTPATIENT
Start: 2022-01-19 | End: 2022-01-24

## 2022-01-19 RX ORDER — EPHEDRINE SULFATE/0.9% NACL/PF 50 MG/5 ML
SYRINGE (ML) INTRAVENOUS AS NEEDED
Status: DISCONTINUED | OUTPATIENT
Start: 2022-01-19 | End: 2022-01-19 | Stop reason: HOSPADM

## 2022-01-19 RX ORDER — DEXMEDETOMIDINE HYDROCHLORIDE 100 UG/ML
INJECTION, SOLUTION INTRAVENOUS AS NEEDED
Status: DISCONTINUED | OUTPATIENT
Start: 2022-01-19 | End: 2022-01-19 | Stop reason: HOSPADM

## 2022-01-19 RX ORDER — SODIUM CHLORIDE 0.9 % (FLUSH) 0.9 %
5-40 SYRINGE (ML) INJECTION EVERY 8 HOURS
Status: DISCONTINUED | OUTPATIENT
Start: 2022-01-19 | End: 2022-01-19 | Stop reason: HOSPADM

## 2022-01-19 RX ORDER — SODIUM CHLORIDE, SODIUM LACTATE, POTASSIUM CHLORIDE, CALCIUM CHLORIDE 600; 310; 30; 20 MG/100ML; MG/100ML; MG/100ML; MG/100ML
25 INJECTION, SOLUTION INTRAVENOUS CONTINUOUS
Status: DISCONTINUED | OUTPATIENT
Start: 2022-01-19 | End: 2022-01-19 | Stop reason: HOSPADM

## 2022-01-19 RX ORDER — ONDANSETRON 8 MG/1
8 TABLET, ORALLY DISINTEGRATING ORAL
Qty: 10 TABLET | Refills: 2 | Status: SHIPPED | OUTPATIENT
Start: 2022-01-19

## 2022-01-19 RX ORDER — DIPHENHYDRAMINE HYDROCHLORIDE 50 MG/ML
12.5 INJECTION, SOLUTION INTRAMUSCULAR; INTRAVENOUS AS NEEDED
Status: DISCONTINUED | OUTPATIENT
Start: 2022-01-19 | End: 2022-01-19 | Stop reason: HOSPADM

## 2022-01-19 RX ORDER — ONDANSETRON 2 MG/ML
INJECTION INTRAMUSCULAR; INTRAVENOUS AS NEEDED
Status: DISCONTINUED | OUTPATIENT
Start: 2022-01-19 | End: 2022-01-19 | Stop reason: HOSPADM

## 2022-01-19 RX ORDER — NEOSTIGMINE METHYLSULFATE 1 MG/ML
INJECTION, SOLUTION INTRAVENOUS AS NEEDED
Status: DISCONTINUED | OUTPATIENT
Start: 2022-01-19 | End: 2022-01-19 | Stop reason: HOSPADM

## 2022-01-19 RX ORDER — FENTANYL CITRATE 50 UG/ML
INJECTION, SOLUTION INTRAMUSCULAR; INTRAVENOUS AS NEEDED
Status: DISCONTINUED | OUTPATIENT
Start: 2022-01-19 | End: 2022-01-19 | Stop reason: HOSPADM

## 2022-01-19 RX ORDER — LIDOCAINE HYDROCHLORIDE 20 MG/ML
INJECTION, SOLUTION EPIDURAL; INFILTRATION; INTRACAUDAL; PERINEURAL AS NEEDED
Status: DISCONTINUED | OUTPATIENT
Start: 2022-01-19 | End: 2022-01-19 | Stop reason: HOSPADM

## 2022-01-19 RX ORDER — SODIUM CHLORIDE 0.9 % (FLUSH) 0.9 %
5-40 SYRINGE (ML) INJECTION AS NEEDED
Status: DISCONTINUED | OUTPATIENT
Start: 2022-01-19 | End: 2022-01-19 | Stop reason: HOSPADM

## 2022-01-19 RX ORDER — LIDOCAINE HYDROCHLORIDE 10 MG/ML
0.1 INJECTION, SOLUTION EPIDURAL; INFILTRATION; INTRACAUDAL; PERINEURAL AS NEEDED
Status: DISCONTINUED | OUTPATIENT
Start: 2022-01-19 | End: 2022-01-19 | Stop reason: HOSPADM

## 2022-01-19 RX ORDER — DEXAMETHASONE SODIUM PHOSPHATE 4 MG/ML
INJECTION, SOLUTION INTRA-ARTICULAR; INTRALESIONAL; INTRAMUSCULAR; INTRAVENOUS; SOFT TISSUE AS NEEDED
Status: DISCONTINUED | OUTPATIENT
Start: 2022-01-19 | End: 2022-01-19 | Stop reason: HOSPADM

## 2022-01-19 RX ORDER — PROPOFOL 10 MG/ML
INJECTION, EMULSION INTRAVENOUS AS NEEDED
Status: DISCONTINUED | OUTPATIENT
Start: 2022-01-19 | End: 2022-01-19 | Stop reason: HOSPADM

## 2022-01-19 RX ORDER — GLYCOPYRROLATE 0.2 MG/ML
INJECTION INTRAMUSCULAR; INTRAVENOUS AS NEEDED
Status: DISCONTINUED | OUTPATIENT
Start: 2022-01-19 | End: 2022-01-19 | Stop reason: HOSPADM

## 2022-01-19 RX ORDER — HYDROMORPHONE HYDROCHLORIDE 2 MG/ML
INJECTION, SOLUTION INTRAMUSCULAR; INTRAVENOUS; SUBCUTANEOUS AS NEEDED
Status: DISCONTINUED | OUTPATIENT
Start: 2022-01-19 | End: 2022-01-19 | Stop reason: HOSPADM

## 2022-01-19 RX ORDER — ROCURONIUM BROMIDE 10 MG/ML
INJECTION, SOLUTION INTRAVENOUS AS NEEDED
Status: DISCONTINUED | OUTPATIENT
Start: 2022-01-19 | End: 2022-01-19 | Stop reason: HOSPADM

## 2022-01-19 RX ADMIN — ROCURONIUM BROMIDE 10 MG: 10 INJECTION INTRAVENOUS at 11:15

## 2022-01-19 RX ADMIN — Medication 80 MCG: at 10:39

## 2022-01-19 RX ADMIN — MIDAZOLAM HYDROCHLORIDE 2 MG: 1 INJECTION, SOLUTION INTRAMUSCULAR; INTRAVENOUS at 10:19

## 2022-01-19 RX ADMIN — Medication 120 MCG: at 10:37

## 2022-01-19 RX ADMIN — FENTANYL CITRATE 25 MCG: 50 INJECTION INTRAMUSCULAR; INTRAVENOUS at 13:19

## 2022-01-19 RX ADMIN — HYDROMORPHONE HYDROCHLORIDE 0.4 MG: 2 INJECTION, SOLUTION INTRAMUSCULAR; INTRAVENOUS; SUBCUTANEOUS at 10:52

## 2022-01-19 RX ADMIN — ONDANSETRON HYDROCHLORIDE 4 MG: 2 INJECTION, SOLUTION INTRAMUSCULAR; INTRAVENOUS at 12:15

## 2022-01-19 RX ADMIN — FENTANYL CITRATE 25 MCG: 50 INJECTION INTRAMUSCULAR; INTRAVENOUS at 14:02

## 2022-01-19 RX ADMIN — SODIUM CHLORIDE 20 MCG/MIN: 900 INJECTION, SOLUTION INTRAVENOUS at 11:00

## 2022-01-19 RX ADMIN — DEXAMETHASONE SODIUM PHOSPHATE 8 MG: 4 INJECTION, SOLUTION INTRAMUSCULAR; INTRAVENOUS at 10:45

## 2022-01-19 RX ADMIN — GLYCOPYRROLATE 0.4 MG: 0.2 INJECTION, SOLUTION INTRAMUSCULAR; INTRAVENOUS at 12:36

## 2022-01-19 RX ADMIN — FENTANYL CITRATE 25 MCG: 50 INJECTION INTRAMUSCULAR; INTRAVENOUS at 13:36

## 2022-01-19 RX ADMIN — FENTANYL CITRATE 25 MCG: 50 INJECTION INTRAMUSCULAR; INTRAVENOUS at 13:28

## 2022-01-19 RX ADMIN — HYDROMORPHONE HYDROCHLORIDE 0.2 MG: 2 INJECTION, SOLUTION INTRAMUSCULAR; INTRAVENOUS; SUBCUTANEOUS at 11:14

## 2022-01-19 RX ADMIN — Medication 3 MG: at 12:36

## 2022-01-19 RX ADMIN — ROCURONIUM BROMIDE 30 MG: 10 INJECTION INTRAVENOUS at 10:45

## 2022-01-19 RX ADMIN — SODIUM CHLORIDE, POTASSIUM CHLORIDE, SODIUM LACTATE AND CALCIUM CHLORIDE 25 ML/HR: 600; 310; 30; 20 INJECTION, SOLUTION INTRAVENOUS at 09:08

## 2022-01-19 RX ADMIN — Medication 100 MCG: at 10:32

## 2022-01-19 RX ADMIN — SUCCINYLCHOLINE CHLORIDE 180 MG: 20 INJECTION, SOLUTION INTRAMUSCULAR; INTRAVENOUS at 10:28

## 2022-01-19 RX ADMIN — Medication 20 MG: at 10:44

## 2022-01-19 RX ADMIN — Medication 10 MG: at 11:36

## 2022-01-19 RX ADMIN — DEXMEDETOMIDINE HYDROCHLORIDE 4 MCG: 100 INJECTION, SOLUTION, CONCENTRATE INTRAVENOUS at 11:19

## 2022-01-19 RX ADMIN — ROCURONIUM BROMIDE 10 MG: 10 INJECTION INTRAVENOUS at 10:27

## 2022-01-19 RX ADMIN — PROPOFOL 150 MG: 10 INJECTION, EMULSION INTRAVENOUS at 10:27

## 2022-01-19 RX ADMIN — Medication 20 MCG: at 10:46

## 2022-01-19 RX ADMIN — LIDOCAINE HYDROCHLORIDE 100 MG: 20 INJECTION, SOLUTION INTRAVENOUS at 10:27

## 2022-01-19 RX ADMIN — LABETALOL HYDROCHLORIDE 10 MG: 5 INJECTION INTRAVENOUS at 13:43

## 2022-01-19 RX ADMIN — FENTANYL CITRATE 100 MCG: 50 INJECTION, SOLUTION INTRAMUSCULAR; INTRAVENOUS at 10:27

## 2022-01-19 RX ADMIN — HYDROMORPHONE HYDROCHLORIDE 0.4 MG: 2 INJECTION, SOLUTION INTRAMUSCULAR; INTRAVENOUS; SUBCUTANEOUS at 10:57

## 2022-01-19 RX ADMIN — DEXMEDETOMIDINE HYDROCHLORIDE 6 MCG: 100 INJECTION, SOLUTION, CONCENTRATE INTRAVENOUS at 11:13

## 2022-01-19 RX ADMIN — Medication 3 AMPULE: at 09:08

## 2022-01-19 RX ADMIN — WATER 2 G: 1 INJECTION INTRAMUSCULAR; INTRAVENOUS; SUBCUTANEOUS at 10:21

## 2022-01-19 RX ADMIN — Medication 80 MCG: at 10:41

## 2022-01-19 NOTE — ANESTHESIA POSTPROCEDURE EVALUATION
Procedure(s):  EXCHANGE RIGHT BREAST TISSUE EXPANDER FOR IMPLANT,  LEFT BREAST REDUCTION, AND LIPOSUCTION BILATERAL FAT GRAFTING. general    Anesthesia Post Evaluation      Multimodal analgesia: multimodal analgesia used between 6 hours prior to anesthesia start to PACU discharge  Patient location during evaluation: PACU  Level of consciousness: sleepy but conscious  Pain management: adequate  Airway patency: patent  Anesthetic complications: no  Cardiovascular status: acceptable  Respiratory status: acceptable  Hydration status: acceptable  Comments: +Post-Anesthesia Evaluation and Assessment    Patient: Ivy Hernandez MRN: [id number]  SSN: [id number]   YOB: 1960  Age: 63 y.o. Sex: female      Cardiovascular Function/Vital Signs    BP (!) 166/73   Pulse 78   Temp 36.4 °C (97.6 °F)   Resp 10   Ht 5' 5\" (1.651 m)   Wt 105.9 kg (233 lb 7.5 oz)   SpO2 98%   BMI 38.85 kg/m²     Patient is status post Procedure(s):  EXCHANGE RIGHT BREAST TISSUE EXPANDER FOR IMPLANT,  LEFT BREAST REDUCTION, AND LIPOSUCTION BILATERAL FAT GRAFTING. Nausea/Vomiting: Controlled. Postoperative hydration reviewed and adequate. Pain:  Pain Scale 1: Numeric (0 - 10) (01/19/22 1403)  Pain Intensity 1: 4 (01/19/22 1403)   Managed. Neurological Status:   Neuro (WDL): Within Defined Limits (01/19/22 1300)   At baseline. Mental Status and Level of Consciousness: Arousable. Pulmonary Status:   O2 Device: None (Room air) (01/19/22 1400)   Adequate oxygenation and airway patent. Complications related to anesthesia: None    Post-anesthesia assessment completed. No concerns.     Signed By: Bonnie Sanchez MD    1/19/2022  Post anesthesia nausea and vomiting:  controlled  Final Post Anesthesia Temperature Assessment:  Normothermia (36.0-37.5 degrees C)      INITIAL Post-op Vital signs:   Vitals Value Taken Time   /73 01/19/22 1400   Temp 36.4 °C (97.6 °F) 01/19/22 1400   Pulse 85 01/19/22 1414   Resp 13 01/19/22 1414   SpO2 95 % 01/19/22 1414   Vitals shown include unvalidated device data.

## 2022-01-19 NOTE — PERIOP NOTES
negative covid test result on 01/14/22, denies S/S. + vaccinated. Belongings to pacu 1 bag  Daughter in waiting room or via cell, see orange sheet . Bed in low position, call bell in reach, side rails up x 2. Patient arrives alert and oriented and has remained.

## 2022-01-19 NOTE — ANESTHESIA PREPROCEDURE EVALUATION
Anesthetic History   No history of anesthetic complications            Review of Systems / Medical History  Patient summary reviewed, nursing notes reviewed and pertinent labs reviewed    Pulmonary  Within defined limits                 Neuro/Psych   Within defined limits           Cardiovascular    Hypertension          Hyperlipidemia    Exercise tolerance: >4 METS  Comments: ECG (1/20/20):  Normal sinus rhythm   No previous ECGs available    GI/Hepatic/Renal  Within defined limits              Endo/Other    Diabetes: type 2    Obesity, morbid obesity and cancer    Comments: Right Breast Cancer s/p right mastectomy and reconstruction (6/63/87) complicated by recurrent seroma, infection s/p  removal of right breast tissue expander (3/13/20), and revision of reconstruction with replacement of right breast tissue expander (9/1/21) Other Findings              Physical Exam    Airway  Mallampati: II  TM Distance: > 6 cm  Neck ROM: normal range of motion   Mouth opening: Normal     Cardiovascular    Rhythm: regular  Rate: normal      Pertinent negatives: No murmur   Dental    Dentition: Poor dentition  Comments: Multiple missing teeth, remaining teeth in poor condition   Pulmonary  Breath sounds clear to auscultation               Abdominal  GI exam deferred       Other Findings            Anesthetic Plan    ASA: 3  Anesthesia type: general    Monitoring Plan: BIS      Induction: Intravenous  Anesthetic plan and risks discussed with: Patient

## 2022-01-19 NOTE — DISCHARGE INSTRUCTIONS
Empty and record drain output twice daily or as needed. May remove bra and dressings in 48hrs and shower and get incisions wet. Wear sports bra unless showering. No heavy lifting or vigorous activity. Patient Education        Surgical Drain Care: Care Instructions  Your Care Instructions     After a surgery, fluid may collect inside your body in the surgical area. This makes an infection or other problems more likely. A surgical drain allows the fluid to flow out. The doctor puts a thin, flexible rubber tube into the area of your body where the fluid is likely to collect. The rubber tube carries the fluid outside your body. The most common type of surgical drain carries the fluid into a collection bulb that you empty. This is called a Michael-Cohn (BRENDA) drain. The drain uses suction created by the bulb to pull the fluid from your body into the bulb. The rubber tube will probably be held in place by one or two stitches in your skin. The bulb will probably be attached with a safety pin to your clothes or near the bandage so that it doesn't flip around or pull on the stitches. Another type of drain is called a Penrose drain. This type of drain doesn't have a bulb. Instead, the end of the tube is open. That allows the fluid to drain onto a dressing taped to your skin. The drain may be kept in place next to your skin with a stitch or a safety pin in the tube. When you first get the drain, the fluid will be bloody. It will change color from red to pink to a light yellow or clear as the wound heals and the fluid starts to go away. Your doctor may give you information on when you no longer need the drain and when it will be removed. Follow-up care is a key part of your treatment and safety. Be sure to make and go to all appointments, and call your doctor if you are having problems. It's also a good idea to know your test results and keep a list of the medicines you take.   How do you empty the bulb of a Michael-Cohn drain? Follow any instructions your doctor gives you. How often you empty the bulb depends on how much fluid is draining. Empty the bulb when it is half full. 1. Wash your hands with soap and water. 2. Take the plug out of the bulb. 3. Empty the bulb. If your doctor asks you to measure the fluid, empty the fluid into a measuring cup, and write down the color and how much you collected. Your doctor will want to know this information. 4. Clean the plug with alcohol. 5. Squeeze the bulb until it is flat. This removes all the air from the bulb. You may need to put the bulb on a table or a counter to flatten it. 6. Keep the bulb flat, and put the plug in. The bulb should stay flat after you put the plug back in. This creates the suction that pulls the fluid into the bulb. 7. Empty the fluid into the toilet. 8. Wash your hands. How do you change the dressing around your surgical drain? You may have a dressing (bandage). The dressing is often made of gauze pads held on with tape. Your doctor will tell you how often to change it. 1. Wash your hands with soap and water. 2. Take off the dressing from around the drain. 3. Clean the drain site and the skin around it with soap and water. Use gauze or a cotton swab. 4. When the site is dry, put on a new dressing. The way your dressing is put on depends on what kind of drain you have. You will get instructions for your type of drain. 5. Wash your hands again with soap and water. Your doctor may ask you to keep track of your dressing changes. Write down the time of day and the amount and color of the fluid on the dressing. How do you help prevent clogs in your surgical drain? Squeezing or \"milking\" the tube of your surgical drain can help prevent clogs so that it drains correctly. Your doctor will tell you when you need to do this. In general, you do this when:  · You see a clot in the tube that prevents fluid from draining.  The clot may look like a dark, stringy lining. · You see fluid leaking around the tube where it goes into the skin. Follow these steps for milking the tube. 1. Use one hand to hold and pinch the tube where it leaves the skin. 2. With the thumb and first finger of your other hand, pinch the tube just below where you're holding it. 3. Slowly and firmly push your thumb and first finger down the tubing toward the end of the tube. 4. Repeat this as many times as needed to move the clot. If you have a Michael-Cohn (BRENDA) drain, the clot should move down the tube and into the bulb. If you have a Penrose drain, the clot should move into the dressing. When should you call for help? Call your doctor now or seek immediate medical care if:    · You have signs of infection, such as:  ? Increased pain, swelling, warmth, or redness around the area. ? Red streaks leading from the area. ? Pus draining from the area. ? A fever.     · You see a sudden change in the color or smell of the drainage.     · The tube is coming loose where it leaves your skin. Watch closely for changes in your health, and be sure to contact your doctor if:    · You see a lot of fluid around the drain.     · You cannot remove a clot from the tube by milking the tube. Where can you learn more? Go to http://www.gray.com/  Enter K117 in the search box to learn more about \"Surgical Drain Care: Care Instructions. \"  Current as of: July 1, 2021               Content Version: 13.0  © 2006-2021 [employer]. Care instructions adapted under license by [employer] (which disclaims liability or warranty for this information). If you have questions about a medical condition or this instruction, always ask your healthcare professional. [place] [address] any warranty or liability for your use of this information.       DISCHARGE SUMMARY from Nurse    PATIENT INSTRUCTIONS:    After general anesthesia or intravenous sedation, for 24 hours or while taking prescription Narcotics:  · Limit your activities  · Do not drive and operate hazardous machinery  · Do not make important personal or business decisions  · Do  not drink alcoholic beverages  · If you have not urinated within 8 hours after discharge, please contact your surgeon on call. Report the following to your surgeon:  · Excessive pain, swelling, redness or odor of or around the surgical area  · Temperature over 100.5  · Nausea and vomiting lasting longer than 4 hours or if unable to take medications  · Any signs of decreased circulation or nerve impairment to extremity: change in color, persistent  numbness, tingling, coldness or increase pain  · Any questions      These are general instructions for a healthy lifestyle:    No smoking/ No tobacco products/ Avoid exposure to second hand smoke  Surgeon General's Warning:  Quitting smoking now greatly reduces serious risk to your health. Obesity, smoking, and sedentary lifestyle greatly increases your risk for illness    A healthy diet, regular physical exercise & weight monitoring are important for maintaining a healthy lifestyle    You may be retaining fluid if you have a history of heart failure or if you experience any of the following symptoms:  Weight gain of 3 pounds or more overnight or 5 pounds in a week, increased swelling in our hands or feet or shortness of breath while lying flat in bed. Please call your doctor as soon as you notice any of these symptoms; do not wait until your next office visit. The discharge information has been reviewed with the patient and daughter. The patient and daughter verbalized understanding.   Discharge medications reviewed with the patient and daughter and appropriate educational materials and side effects teaching were provided.   ___________________________________________________________________________________________________________________________________

## 2022-01-19 NOTE — BRIEF OP NOTE
Brief Postoperative Note    Patient: George Rogers  YOB: 1960  MRN: [id number]    Date of Procedure: 1/19/2022     Pre-Op Diagnosis: BREAST CANCER    Post-Op Diagnosis: Same as preoperative diagnosis. Procedure(s):  EXCHANGE RIGHT BREAST TISSUE EXPANDER FOR IMPLANT (800ml mp profile),  LEFT BREAST REDUCTION (690g plus 100ml aspirate), AND right breast FAT GRAFTING (180ml)  Surgeon(s):  Mp Cade MD    Surgical Assistant: Surg Asst-1: Mono MCKEON    Anesthesia: General     Estimated Blood Loss (mL): less than 056     Complications: None    Specimens:   ID Type Source Tests Collected by Time Destination   1 : Left breast tissue Preservative Breast  Mp Cade MD 1/19/2022 1150 Pathology        Implants:   Implant Name Type Inv.  Item Serial No.  Lot No. LRB No. Used Action   IMPL BRST RND MP+ GEL 800ML --  - [id number]  IMPL BRST RND MP+ GEL 800ML --  [id number] [employer] [id number] Right 1 Implanted       Drains:   Michael-Cohn Drain 01/19/22 Left Breast (Active)       Findings: see note    Electronically Signed by Brielle Wang MD on 1/19/2022 at 12:46 PM

## 2022-01-19 NOTE — PERIOP NOTES
[address] [facility] from Operating Room to PACU    Report received from [facility] and A Vandana Caal CRNA regarding Alcario Root. Surgeon(s):  Sean Jon MD  And Procedure(s) (LRB):  EXCHANGE RIGHT BREAST TISSUE EXPANDER FOR IMPLANT,  LEFT BREAST REDUCTION, AND LIPOSUCTION BILATERAL FAT GRAFTING (Bilateral)  confirmed   with allergies, drains and dressings discussed. Anesthesia type, drugs, patient history, complications, estimated blood loss, vital signs, intake and output, and last pain medication, lines, reversal medications and temperature were reviewed.

## 2022-01-20 LAB — HGB BLD-MCNC: 11.6 G/DL (ref 11.5–16)

## 2022-01-21 NOTE — OP NOTES
[address]  OPERATIVE REPORT    Name:  Tremaine Turcios  MR#:  [id number]  :  1960  ACCOUNT #:  [de-identified]  DATE OF SERVICE:  2022    PREOPERATIVE DIAGNOSES:  1. Personal history of breast cancer. 2.  Acquired absence of bilateral breasts. 3.  Breast disproportion after reconstruction. 4.  Breast deformity after reconstruction. 5.  Left breast hypertrophy. POSTOPERATIVE DIAGNOSES:  1. Personal history of breast cancer. 2.  Acquired absence of bilateral breasts. 3.  Breast disproportion after reconstruction. 4.  Breast deformity after reconstruction. 5.  Left breast hypertrophy. PROCEDURES PERFORMED:  1. Removal right breast tissue expander and delayed placement of a dual plane implant for reconstruction ([place] 800 mL moderate plus profile smooth round gel). 2.  Right breast reconstruction using fat grafting (180 mL). 3.  Left breast reduction (690 g plus 100 mL lipoaspirate). SURGEON:  Ansley Vuong MD    ASSISTANT:  Idalia Beck. ANESTHESIA:  General endotracheal.    COMPLICATIONS:  None. SPECIMENS REMOVED:  Left breast tissue. IMPLANTS:  See above    ESTIMATED BLOOD LOSS:  Less than 100 mL. INDICATIONS:  This 61-year-old female previously undergone right skin-sparing mastectomy and dual plane reconstruction with tissue expander and acellular dermal matrix. She had been expanded uneventfully and was ready for delayed placement of implant to continue her reconstruction. She had a large, pendulous breast on the left and as a result, significant breast asymmetry. Breast reduction on this side was indicated to improve symmetry and for her to return to normal function and appearance. She also had contour defects on the right breast upper pole that necessitated fat grafting. She was morbidly obese. Risks, benefits, alternatives were discussed preoperatively, and she agreed to proceed.     PROCEDURE:  After informed consent was obtained, she was marked preoperatively in the holding area and taken to the operating room. General anesthetic was given, and the chest and abdomen were prepped and draped. Attention was first turned to the lower abdomen, where small stab incisions were made. Approximately 500 mL of a wetting solution consisting of 1 amp of epinephrine, 30 mL of 0.5% bupivacaine plain, and 30 mL of 1% lidocaine plain was infiltrated into the subcutaneous tissue. The same wetting solution was also injected into the left breast.  After waiting approximately 10 minutes, attention was turned to the left breast, where a vertical reduction pattern had been outlined. A medially based nipple pedicle was designed and de-epithelialized. The pedicle was then elevated at a depth of approximately 3-5 cm. Remaining breast parenchyma within the vertical keyhole pattern was then excised, focusing on the inferior and lateral breast.  Small amount of liposuction was performed laterally. The specimen was weighed and sent for permanent pathology. Hemostasis was obtained. The nipple was transposed superiorly and secured with 3-0 Vicryl. The vertical pillars were approximated with 2-0 PDS, effectively coning the breast.  Final incisions were closed in layers with absorbable staples and sutures over a 7-mm flat Michael-Cohn drain. Attention was returned to the abdomen, where fat was harvested using a 4-mm cannula and the power assisted liposuction. The fat was rinsed, filtered, and transferred to 10 mL syringes. Fat grafting was then performed using Richards cannulas up to the upper pole of the left breast to improve contour deficit in this area. Multiple passes were made from the muscle to the dermis. Small incisions were closed with 5-0 fast-absorbing plain gut. Her right inframammary scar was incised, and sharp dissection revealed the expander, which was deflated and removed. Periprosthetic capsulotomy was performed along the upper pole.   The pocket was irrigated. Gel sizers were removed until matching size was obtained. This was determined to be 800 mL. An 800 mL moderate plus profile was brought onto the field and rinsed in Betadine. The pocket was irrigated with dilute chlorhexidine followed by saline and Betadine. The implant was then inserted using the no-touch technique and the Gillis funnel. Final closure was performed in layers with absorbable suture. Dermabond and dry dressings were applied throughout, along with a surgical bra. She tolerated the procedure well and was transferred to the recovery room in good condition.       MD KAITLYN [email or web address]  D:  01/21/2022 10:48  T:  01/21/2022 15:26  JOB #:  [id number]

## 2022-03-10 DIAGNOSIS — E55.9 VITAMIN D DEFICIENCY: ICD-10-CM

## 2022-03-10 DIAGNOSIS — Z17.0 MALIGNANT NEOPLASM OF BREAST IN FEMALE, ESTROGEN RECEPTOR POSITIVE, UNSPECIFIED LATERALITY, UNSPECIFIED SITE OF BREAST (HCC): ICD-10-CM

## 2022-03-10 DIAGNOSIS — C50.919 MALIGNANT NEOPLASM OF BREAST IN FEMALE, ESTROGEN RECEPTOR POSITIVE, UNSPECIFIED LATERALITY, UNSPECIFIED SITE OF BREAST (HCC): ICD-10-CM

## 2022-03-11 RX ORDER — ERGOCALCIFEROL 1.25 MG/1
CAPSULE ORAL
Qty: 4 CAPSULE | Refills: 0 | Status: SHIPPED | OUTPATIENT
Start: 2022-03-11 | End: 2022-06-15

## 2022-03-19 PROBLEM — E66.01 SEVERE OBESITY (HCC): Status: ACTIVE | Noted: 2020-03-04

## 2022-03-20 PROBLEM — C50.919 BREAST CANCER IN FEMALE (HCC): Status: ACTIVE | Noted: 2020-01-29

## 2022-03-31 DIAGNOSIS — Z17.0 MALIGNANT NEOPLASM OF RIGHT BREAST IN FEMALE, ESTROGEN RECEPTOR POSITIVE, UNSPECIFIED SITE OF BREAST (HCC): ICD-10-CM

## 2022-03-31 DIAGNOSIS — C50.911 MALIGNANT NEOPLASM OF RIGHT BREAST IN FEMALE, ESTROGEN RECEPTOR POSITIVE, UNSPECIFIED SITE OF BREAST (HCC): ICD-10-CM

## 2022-03-31 DIAGNOSIS — E55.9 VITAMIN D DEFICIENCY: ICD-10-CM

## 2022-03-31 RX ORDER — LETROZOLE 2.5 MG/1
2.5 TABLET, FILM COATED ORAL DAILY
Qty: 30 TABLET | Refills: 0 | Status: SHIPPED | OUTPATIENT
Start: 2022-03-31 | End: 2022-05-11

## 2022-03-31 NOTE — TELEPHONE ENCOUNTER
PER FERMÍN from Dr. Quynh Pandey LETROZOLE 2.5MG [facility]) ONE TAB ONCE A DAY QUANTITY 30 REFILL 0

## 2022-04-05 NOTE — PROGRESS NOTES
Felipe Mckeon is a 63 y.o. female here for 6 month follow up for right breast cancer. She is on Letrozole. She is taking everyday. She had implant placed and will soon have breast reduction on other side to match. 1. Have you been to the ER, urgent care clinic since your last visit? Hospitalized since your last visit? no    2. Have you seen or consulted any other health care providers outside of the [address] [place] [address] since your last visit? Include any pap smears or colon screening.  U

## 2022-04-06 ENCOUNTER — OFFICE VISIT (OUTPATIENT)
Dept: ONCOLOGY | Age: 62
End: 2022-04-06
Payer: COMMERCIAL

## 2022-04-06 VITALS
WEIGHT: 233 LBS | HEIGHT: 65 IN | DIASTOLIC BLOOD PRESSURE: 75 MMHG | OXYGEN SATURATION: 96 % | HEART RATE: 83 BPM | TEMPERATURE: 98.3 F | BODY MASS INDEX: 38.82 KG/M2 | SYSTOLIC BLOOD PRESSURE: 147 MMHG

## 2022-04-06 DIAGNOSIS — C50.911 MALIGNANT NEOPLASM OF RIGHT BREAST IN FEMALE, ESTROGEN RECEPTOR POSITIVE, UNSPECIFIED SITE OF BREAST (HCC): Primary | ICD-10-CM

## 2022-04-06 DIAGNOSIS — Z17.0 MALIGNANT NEOPLASM OF RIGHT BREAST IN FEMALE, ESTROGEN RECEPTOR POSITIVE, UNSPECIFIED SITE OF BREAST (HCC): Primary | ICD-10-CM

## 2022-04-06 PROCEDURE — 99213 OFFICE O/P EST LOW 20 MIN: CPT | Performed by: INTERNAL MEDICINE

## 2022-04-06 NOTE — PROGRESS NOTES
[address] [facility] [address], [address] [facility], [address] [place] [address]  [phone]      Oncology Note      Patient: Katherine Burger MRN: [id number]  SSN: [id number]    YOB: 1960  Age: 63 y.o. Sex: female        Diagnosis:     1. Right breast carcinoma:  T1b N0 M0 (Stage I) infiltrating ductal carcinoma, Tumor size two foci both 8 mm in size, LN -ve, grade 2, %, HI 50%, Her 2 -ve     Treatment:     1. S/P right breast simple mastectomy 01/29/2020  2. Letrozole    Subjective: Katherine Burger is a 63 y.o. female who I am seeing in consultation for a new diagnosis of right sided invasive breast carcinoma. This was detected on screening mammogram, which led to diagnostic studies and a biopsy. The patient mentioned having LEFT nipple drainage in the past and RIGHT breast infection but that there was no palpable lump or specific problem prior to this screening mammogram in October. She was seen by Dr. Quintin Cook. The biopsy revealed grade 2 IDC % HI 50%. She underwent a right sided simple mastectomy and there were two foci of disease, both less than 1 cm in size. She is taking Letrozole and reports mild hot flashes that are manageable. She is getting mammograms done at [facility]. Denies any new symptoms.       Review of Systems:    Constitutional: hot flashes  Eyes: negative  Ears, Nose, Mouth, Throat, and Face: negative  Respiratory: negative  Cardiovascular: negative  Gastrointestinal: negative  Genitourinary:negative  Integument/Breast: negative  Hematologic/Lymphatic: negative  Musculoskeletal:negative  Neurological: negative      Past Medical History:   Diagnosis Date    Breast cancer ([address] 2019    right    Cancer ([address] 11/2019    RIGHT breast    Diabetes ([address]     Hypercholesterolemia     Hypertension      Past Surgical History:   Procedure Laterality Date    HX BREAST BIOPSY Right 2019    idc    HX BREAST RECONSTRUCTION Right 1/29/2020    BREAST RECONSTRUCTION performed by Ben Farias MD at [facility] MAIN OR    HX BREAST RECONSTRUCTION Right 3/13/2020    REMOVAL RIGHT BREAST TISSUE EXPANDER performed by Ben Farias MD at [facility] MAIN OR    HX BREAST RECONSTRUCTION Right 9/1/2021    RIGHT BREAST RECONSTRUCTION WITH TISSUE EXPANDER performed by [facility]arias MD at [facility] MAIN OR    HX BREAST RECONSTRUCTION Bilateral 1/19/2022    EXCHANGE RIGHT BREAST TISSUE EXPANDER FOR IMPLANT,  LEFT BREAST REDUCTION, AND LIPOSUCTION BILATERAL FAT GRAFTING performed by Ben Farias MD at [facility] MAIN OR    HX GYN      D&C    HX GYN      vaginal deliveries    HX GYN      BTL    HX MASTECTOMY Right 1/29/2020    RIGHT BREAST MASTECTOMY AND RIGHT BREAST SENTINEL NODE BIOPSY, RIGHT BREAST RECONSTRUCTION WITIH TISSUE EXPANDER AND ACELLULAR DERMAL MATRIX performed by Shiva Yo MD at [facility] MAIN OR    HX ORTHOPAEDIC Bilateral     carpal tunnel release    NEUROLOGICAL PROCEDURE UNLISTED Bilateral     CTR      Family History   Problem Relation Age of Onset    Lung Cancer Mother     Cancer Mother         Lung Cancer    Hypertension Mother     Cancer Father         leukemia    No Known Problems Sister     No Known Problems Brother     No Known Problems Maternal Grandmother     No Known Problems Maternal Grandfather     No Known Problems Paternal Grandmother     No Known Problems Paternal Grandfather     Cancer Brother         Prostate     Social History     Tobacco Use    Smoking status: Never Smoker    Smokeless tobacco: Never Used   Substance Use Topics    Alcohol use: No      Prior to Admission medications    Medication Sig Start Date End Date Taking? Authorizing Provider   letrozole On license of [facility]) 2.5 mg tablet Take 1 Tablet by mouth daily.  3/31/22  Yes Carline Riddle MD   ergocalciferol (ERGOCALCIFEROL) 1,250 mcg (50,000 unit) capsule Take 1 capsule by mouth once a week 3/11/22  Yes Cha Mir NP vitamin E acetate (VITAMIN E PO) Take  by mouth daily. Yes Provider, Historical   atorvastatin (LIPITOR) 20 mg tablet TAKE 1 TABLET BY MOUTH ONCE DAILY 11/18/19  Yes Provider, Historical   irbesartan (AVAPRO) 150 mg tablet Take 150 mg by mouth daily. Yes Provider, Historical   metFORMIN ER (GLUCOPHAGE XR) 500 mg tablet Take 500 mg by mouth daily. 2/21/19  Yes Provider, Historical   amLODIPine (NORVASC) 10 mg tablet daily. 11/1/18  Yes Provider, Historical   ondansetron (ZOFRAN ODT) 8 mg disintegrating tablet Take 1 Tablet by mouth every eight (8) hours as needed for Nausea. Patient not taking: Reported on 4/6/2022 1/19/22   Sheela Kc MD        No Known Allergies      Objective:     Vitals:    04/06/22 0948   BP: (!) 147/75   Pulse: 83   Temp: 98.3 °F (36.8 °C)   TempSrc: Temporal   SpO2: 96%   Weight: 233 lb (105.7 kg)   Height: 5' 5\" (1.651 m)      Pain Scale: 0 - No pain/10      Physical Exam:    GENERAL: alert, cooperative, no distress, appears stated age  EYE: conjunctivae/corneas clear. LYMPHATIC: Cervical, supraclavicular, and axillary nodes normal.   THROAT & NECK: normal and no erythema or exudates noted. LUNG: clear to auscultation bilaterally  HEART: regular rate and rhythm. ABDOMEN: soft, non-tender. EXTREMITIES:  extremities normal, atraumatic, no cyanosis or edema  SKIN: Normal.  NEUROLOGIC: AOx3. Gait normal.    The above physical exam was reviewed and updated as needed on 04/06/22. Assessment:     1. Right breast carcinoma:  T1b N0 M0 (Stage I) infiltrating ductal carcinoma, Tumor size two foci both 8 mm in size, LN -ve, grade 2, %, [place] 50%, Her 2 -ve      ECOG PS 0  Intent of Treatment - curative  Prognosis - excellent    S/P right breast simple mastectomy 01/29/2020  On Letrozole. Tolerating well. No side effects    Mamm[facility] done at [facility] - normal per patient.   DEXA (11/18/21): normal      Plan:     > Continue Letrozole  > Follow-up in 6 months      Signed By: Herman Boogie NP     April 6, 2022         I personally saw and evaluated the patient and performed the key components of medical decision making. The history, physical exam, and documentation were performed by Jackelyn Aguirre NP. I reviewed and verified the above documentation and modified it as needed. Signed by: Cherry Hammans, MD                     April 6, 2022      CC. Alonzo Gannon MD  CC.  Neli Wells MD

## 2022-04-06 NOTE — LETTER
4/6/2022    Patient: Eugene Amezcua   YOB: 1960   Date of Visit: 4/6/2022     Sheeba Paez MD  [facility] [address] [id number]  Via Fax: [phone]    Dear Sheeba Paez MD,      Thank you for referring Ms. Pierre Hayes to [address] [place] [address] for evaluation. My notes for this consultation are attached. If you have questions, please do not hesitate to call me. I look forward to following your patient along with you.       Sincerely,    Diana Coon MD

## 2022-06-14 DIAGNOSIS — C50.919 MALIGNANT NEOPLASM OF BREAST IN FEMALE, ESTROGEN RECEPTOR POSITIVE, UNSPECIFIED LATERALITY, UNSPECIFIED SITE OF BREAST (HCC): ICD-10-CM

## 2022-06-14 DIAGNOSIS — Z17.0 MALIGNANT NEOPLASM OF BREAST IN FEMALE, ESTROGEN RECEPTOR POSITIVE, UNSPECIFIED LATERALITY, UNSPECIFIED SITE OF BREAST (HCC): ICD-10-CM

## 2022-06-14 DIAGNOSIS — E55.9 VITAMIN D DEFICIENCY: ICD-10-CM

## 2022-06-15 RX ORDER — ERGOCALCIFEROL 1.25 MG/1
CAPSULE ORAL
Qty: 4 CAPSULE | Refills: 0 | Status: SHIPPED | OUTPATIENT
Start: 2022-06-15 | End: 2022-08-24

## 2022-10-10 ENCOUNTER — OFFICE VISIT (OUTPATIENT)
Dept: ONCOLOGY | Age: 62
End: 2022-10-10
Payer: COMMERCIAL

## 2022-10-10 VITALS
BODY MASS INDEX: 39.32 KG/M2 | SYSTOLIC BLOOD PRESSURE: 161 MMHG | DIASTOLIC BLOOD PRESSURE: 85 MMHG | TEMPERATURE: 97.7 F | HEIGHT: 65 IN | RESPIRATION RATE: 18 BRPM | HEART RATE: 103 BPM | OXYGEN SATURATION: 95 % | WEIGHT: 236 LBS

## 2022-10-10 DIAGNOSIS — Z17.0 MALIGNANT NEOPLASM OF BREAST IN FEMALE, ESTROGEN RECEPTOR POSITIVE, UNSPECIFIED LATERALITY, UNSPECIFIED SITE OF BREAST (HCC): Primary | ICD-10-CM

## 2022-10-10 DIAGNOSIS — C50.919 MALIGNANT NEOPLASM OF BREAST IN FEMALE, ESTROGEN RECEPTOR POSITIVE, UNSPECIFIED LATERALITY, UNSPECIFIED SITE OF BREAST (HCC): Primary | ICD-10-CM

## 2022-10-10 PROCEDURE — 99213 OFFICE O/P EST LOW 20 MIN: CPT | Performed by: INTERNAL MEDICINE

## 2022-10-10 RX ORDER — DICLOFENAC SODIUM 75 MG/1
75 TABLET, DELAYED RELEASE ORAL
COMMUNITY
Start: 2022-06-07 | End: 2023-06-07

## 2022-10-10 RX ORDER — TRIAMCINOLONE ACETONIDE 1 MG/G
CREAM TOPICAL DAILY
COMMUNITY
Start: 2022-02-09

## 2022-10-10 NOTE — LETTER
10/10/2022    Patient: Macario Diehl   YOB: 1960   Date of Visit: 10/10/2022     Belinda Maddox MD  [facility]  [facility] [id number]  Via Fax: [phone]    Dear Belinda Maddox MD,      Thank you for referring Ms. Bennie Rosario to [address] [place] [address] for evaluation. My notes for this consultation are attached. If you have questions, please do not hesitate to call me. I look forward to following your patient along with you.       Sincerely,    Ranjeet Baez MD

## 2022-10-10 NOTE — PROGRESS NOTES
Macario Diehl is a 63 y.o. female  Pt here today for right breast cancer. Pt reports feeling good pt denies any skin rash or dimpling. Chief Complaint   Patient presents with    Follow-up    Breast Cancer     1. Have you been to the ER, urgent care clinic since your last visit? Hospitalized since your last visit? No    2. Have you seen or consulted any other health care providers outside of the [address] [place] [address] since your last visit? Include any pap smears or colon screening.  No

## 2022-10-10 NOTE — PROGRESS NOTES
[address] [facility] [address], [address] [facility], [address] [place] [address]  [phone]      Oncology Note      Patient: Avril Saunders MRN: [id number]  SSN: [id number]    YOB: 1960  Age: 63 y.o. Sex: female        Diagnosis:     1. Right breast carcinoma:  T1b N0 M0 (Stage I) infiltrating ductal carcinoma, Tumor size two foci both 8 mm in size, LN -ve, grade 2, %, [place] 50%, Her 2 -ve     Treatment:     1. S/P right breast simple mastectomy 01/29/2020  2. Letrozole    Subjective: Avril Saunders is a 63 y.o. female who I am seeing in consultation for a new diagnosis of right sided invasive breast carcinoma. This was detected on screening mammogram, which led to diagnostic studies and a biopsy. The patient mentioned having LEFT nipple drainage in the past and RIGHT breast infection but that there was no palpable lump or specific problem prior to this screening mammogram in October. She was seen by Dr. Latia Ivy. The biopsy revealed grade 2 IDC % MI 50%. She underwent a right sided simple mastectomy and there were two foci of disease, both less than 1 cm in size. She is taking Letrozole and reports mild hot flashes that are manageable. She is getting mammograms done at [facility]. Denies any new symptoms.       Review of Systems:    Constitutional: hot flashes  Eyes: negative  Ears, Nose, Mouth, Throat, and Face: negative  Respiratory: negative  Cardiovascular: negative  Gastrointestinal: negative  Genitourinary:negative  Integument/Breast: negative  Hematologic/Lymphatic: negative  Musculoskeletal:negative  Neurological: negative      Past Medical History:   Diagnosis Date    Breast cancer ([address] 2019    right    Cancer ([address] 11/2019    RIGHT breast    Diabetes ([address]     Hypercholesterolemia     Hypertension      Past Surgical History:   Procedure Laterality Date    HX BREAST BIOPSY Right 2019    idc    HX BREAST RECONSTRUCTION Right 3/13/2020    REMOVAL RIGHT BREAST TISSUE EXPANDER performed by Krystle Nicole MD at [facility] MAIN OR    HX BREAST RECONSTRUCTION Right 9/1/2021    RIGHT BREAST RECONSTRUCTION WITH TISSUE EXPANDER performed by Krystle Nicole MD at [facility] MAIN OR    HX BREAST RECONSTRUCTION Bilateral 1/19/2022    EXCHANGE RIGHT BREAST TISSUE EXPANDER FOR IMPLANT,  LEFT BREAST REDUCTION, AND LIPOSUCTION BILATERAL FAT GRAFTING performed by Krystle Nicole MD at [facility] MAIN OR    HX BREAST RECONSTRUCTION Right 1/29/2020    BREAST RECONSTRUCTION performed by Krystle Nicole MD at [facility] MAIN OR    HX GYN      D&C    HX GYN      vaginal deliveries    HX GYN      BTL    HX MASTECTOMY Right 1/29/2020    RIGHT BREAST MASTECTOMY AND RIGHT BREAST SENTINEL NODE BIOPSY, RIGHT BREAST RECONSTRUCTION WITIH TISSUE EXPANDER AND ACELLULAR DERMAL MATRIX performed by Devorah Hitchcock MD at [facility] MAIN OR    HX ORTHOPAEDIC Bilateral     carpal tunnel release    NEUROLOGICAL PROCEDURE UNLISTED Bilateral     CTR      Family History   Problem Relation Age of Onset    Lung Cancer Mother     Cancer Mother         Lung Cancer    Hypertension Mother     Cancer Father         leukemia    No Known Problems Sister     No Known Problems Brother     No Known Problems Maternal Grandmother     No Known Problems Maternal Grandfather     No Known Problems Paternal Grandmother     No Known Problems Paternal Grandfather     Cancer Brother         Prostate     Social History     Tobacco Use    Smoking status: Never    Smokeless tobacco: Never   Substance Use Topics    Alcohol use: No      Prior to Admission medications    Medication Sig Start Date End Date Taking? Authorizing Provider   vitamin E acetate (VITAMIN E PO) Take  by mouth daily. Yes Provider, Historical   triamcinolone acetonide (KENALOG) 0.1 % topical cream Apply  to affected area daily.  2/9/22  Yes Provider, Historical   diclofenac EC (VOLTAREN) 75 mg EC tablet Take 75 mg by mouth two (2) times daily as needed. 6/7/22 6/7/23 Yes Provider, Historical   ergocalciferol (ERGOCALCIFEROL) 1,250 mcg (50,000 unit) capsule Take 1 capsule by mouth once a week 8/24/22  Yes Jacklyn Cardoza MD   letrozole [facility]) 2.5 mg tablet Take 1 tablet by mouth once daily 5/11/22  Yes Jacklyn Cardoza MD   vitamin E acetate (VITAMIN E PO) Take  by mouth daily. Yes Provider, Historical   atorvastatin (LIPITOR) 20 mg tablet TAKE 1 TABLET BY MOUTH ONCE DAILY 11/18/19  Yes Provider, Historical   irbesartan (AVAPRO) 150 mg tablet Take 150 mg by mouth daily. Yes Provider, Historical   metFORMIN ER (GLUCOPHAGE XR) 500 mg tablet Take 500 mg by mouth daily. 2/21/19  Yes Provider, Historical   amLODIPine (NORVASC) 10 mg tablet daily. 11/1/18  Yes Provider, Historical   ondansetron (ZOFRAN ODT) 8 mg disintegrating tablet Take 1 Tablet by mouth every eight (8) hours as needed for Nausea. Patient not taking: No sig reported 1/19/22   Donavon Parisi MD        No Known Allergies      Objective:     Vitals:    10/10/22 1143   BP: (!) 161/85   Pulse: (!) 103   Resp: 18   Temp: 97.7 °F (36.5 °C)   TempSrc: Oral   SpO2: 95%   Weight: 236 lb (107 kg)   Height: 5' 5\" (1.651 m)        Pain Scale: 0 - No pain/10      Physical Exam:    GENERAL: alert, cooperative, no distress, appears stated age  EYE: conjunctivae/corneas clear. LYMPHATIC: Cervical, supraclavicular, and axillary nodes normal.   THROAT & NECK: normal and no erythema or exudates noted. LUNG: clear to auscultation bilaterally  HEART: regular rate and rhythm. ABDOMEN: soft, non-tender. EXTREMITIES:  extremities normal, atraumatic, no cyanosis or edema  SKIN: Normal.  NEUROLOGIC: AOx3. Gait normal.    The above physical exam was reviewed and updated as needed on 10/10/22. Assessment:     1.  Right breast carcinoma:  T1b N0 M0 (Stage I) infiltrating ductal carcinoma, Tumor size two foci both 8 mm in size, LN -ve, grade 2, %, [place] 50%, Her 2 -ve      ECOG PS 0  Intent of Treatment - curative  Prognosis - excellent    S/P right breast simple mastectomy 01/29/2020  On Letrozole. Tolerating well. No side effects    Mammograms done at [facility] - normal per patient. DEXA (11/18/21): normal      Plan:     > Continue Letrozole  > Follow-up in 1 yr      Signed By: Zoey Heck NP     October 10, 2022         I personally saw and evaluated the patient and performed the key components of medical decision making with Selwyn Eric NP. I reviewed and verified the above documentation and modified it as needed. Ms. Nick Reynoso is a women with right sided breast ca. She is taking AI and will continue today. I obtained history, performed physical exam, reviewed labs and imaging and communicated the treatment plan to the patient. Signed by: Cristina Tristan MD                     October 10, 2022        CC. Angelina Prajapati MD  CC.  Surendra Graham MD

## 2023-05-30 RX ORDER — ERGOCALCIFEROL 1.25 MG/1
CAPSULE ORAL
Qty: 12 CAPSULE | Refills: 1 | Status: SHIPPED | OUTPATIENT
Start: 2023-05-30

## 2023-05-30 NOTE — TELEPHONE ENCOUNTER
Orders Placed This Encounter    vitamin D (ERGOCALCIFEROL) 1.25 MG (31486 UT) CAPS capsule     Sig: Take 1 capsule by mouth once a week     Dispense:  12 capsule     Refill:  1     Follow Up Scheduled 10/11/2023    For Pharmacy Admin Tracking Only    Program: Medical Group  CPA in place:  Yes  Time Spent (min): 5

## 2023-10-05 NOTE — PROGRESS NOTES
Sheryl Alberts is a 66 y.o. female here for one year follow up for right breast cancer. She is taking Letrozole. She is taking everyday. Pt doing well. No concerns brought up. 1. Have you been to the ER, urgent care clinic since your last visit? Hospitalized since your last visit? no    2. Have you seen or consulted any other health care providers outside of the [address] [place] [address] since your last visit? Include any pap smears or colon screening.   no

## 2023-10-11 ENCOUNTER — OFFICE VISIT (OUTPATIENT)
Age: 63
End: 2023-10-11
Payer: COMMERCIAL

## 2023-10-11 VITALS
TEMPERATURE: 98.2 F | SYSTOLIC BLOOD PRESSURE: 139 MMHG | OXYGEN SATURATION: 98 % | HEART RATE: 87 BPM | HEIGHT: 65 IN | DIASTOLIC BLOOD PRESSURE: 73 MMHG | BODY MASS INDEX: 39.07 KG/M2 | WEIGHT: 234.5 LBS

## 2023-10-11 DIAGNOSIS — Z17.0 MALIGNANT NEOPLASM OF UPPER-OUTER QUADRANT OF RIGHT BREAST IN FEMALE, ESTROGEN RECEPTOR POSITIVE (HCC): Primary | ICD-10-CM

## 2023-10-11 DIAGNOSIS — C50.411 MALIGNANT NEOPLASM OF UPPER-OUTER QUADRANT OF RIGHT BREAST IN FEMALE, ESTROGEN RECEPTOR POSITIVE (HCC): Primary | ICD-10-CM

## 2023-10-11 PROCEDURE — 99214 OFFICE O/P EST MOD 30 MIN: CPT | Performed by: INTERNAL MEDICINE

## 2023-10-11 NOTE — PROGRESS NOTES
[facility]   at [address] [place] [address], [facility], [address] [place] [address]   [phone]      Oncology Note           Patient: Albert Garcia  MRN: [id number]   SSN: [id number]          YOB: 1960                Diagnosis:        1. Right breast carcinoma:   T1b N0 M0 (Stage I) infiltrating ductal carcinoma, Tumor size two foci both 8 mm in size, LN -ve, grade 2, %, RI 50%, Her 2 -ve           Treatment:        1. S/P right breast simple mastectomy 01/29/2020   2. Letrozole         Subjective: Albert Garcia is a 63 y.o. female who I am seeing in consultation for a new diagnosis of right sided invasive breast carcinoma. This was detected on screening mammogram, which led to diagnostic studies  and a biopsy. The patient mentioned having LEFT nipple drainage in the past and RIGHT breast infection but that there was no palpable lump or specific problem prior to this screening mammogram in October. She was seen by Dr. Nancy Angulo. The biopsy revealed  grade 2 IDC % RI 50%. She underwent a right sided simple mastectomy and there were two foci of disease, both less than 1 cm in size. She is taking Letrozole and reports mild hot flashes that are manageable. She is getting mammograms done at [facility]. Denies any new symptoms.          Review of Systems:      Constitutional: hot flashes   Eyes: negative   Ears, Nose, Mouth, Throat, and Face: negative   Respiratory: negative   Cardiovascular: negative   Gastrointestinal: negative   Genitourinary:negative   Integument/Breast: negative   Hematologic/Lymphatic: negative   Musculoskeletal:negative   Neurological: negative          Past Medical History:   Diagnosis Date    Breast cancer ([address]) 2019    right    Cancer ([address]) 11/2019    RIGHT breast    Diabetes ([address])     Hypercholesterolemia     Hypertension        Past Surgical History:   Procedure Laterality Date

## 2023-11-22 ENCOUNTER — HOSPITAL ENCOUNTER (OUTPATIENT)
Facility: HOSPITAL | Age: 63
Discharge: HOME | End: 2023-11-25
Attending: INTERNAL MEDICINE
Payer: COMMERCIAL

## 2023-11-22 DIAGNOSIS — Z17.0 MALIGNANT NEOPLASM OF UPPER-OUTER QUADRANT OF RIGHT BREAST IN FEMALE, ESTROGEN RECEPTOR POSITIVE (HCC): ICD-10-CM

## 2023-11-22 DIAGNOSIS — C50.411 MALIGNANT NEOPLASM OF UPPER-OUTER QUADRANT OF RIGHT BREAST IN FEMALE, ESTROGEN RECEPTOR POSITIVE (HCC): ICD-10-CM

## 2023-11-22 PROCEDURE — 77080 DXA BONE DENSITY AXIAL: CPT

## 2024-01-11 ENCOUNTER — TELEPHONE (OUTPATIENT)
Age: 64
End: 2024-01-11

## 2024-01-11 NOTE — TELEPHONE ENCOUNTER
Pt stated she was advised that a new order needs to be put in for her to be tested to see if she still needs to take the chemo pill. Stated she tried to set up an appt but it is past due.

## 2024-01-12 NOTE — TELEPHONE ENCOUNTER
Called [facility].  Informed that since pt cancelled testing a new order is needed to run through insurance again.    Called pt.  HIPAA verified by two patient identifiers.   She is aware we will update order.

## 2024-02-12 RX ORDER — LETROZOLE 2.5 MG/1
2.5 TABLET, FILM COATED ORAL DAILY
Qty: 90 TABLET | Refills: 3 | Status: SHIPPED | OUTPATIENT
Start: 2024-02-12

## 2024-08-01 RX ORDER — ERGOCALCIFEROL 1.25 MG/1
CAPSULE ORAL
Qty: 12 CAPSULE | Refills: 0 | Status: SHIPPED | OUTPATIENT
Start: 2024-08-01

## 2024-10-09 NOTE — PROGRESS NOTES
Clare Villarreal is a 64 y.o. female here for one year follow up for right breast cancer.  She is taking Letrozole. She is taking everyday.  Pt doing well. No concerns brought up.     1. Have you been to the ER, urgent care clinic since your last visit?  Hospitalized since your last visit? no    2. Have you seen or consulted any other health care providers outside of the [facility] since your last visit?  Include any pap smears or colon screening. PCP

## 2024-10-14 ENCOUNTER — OFFICE VISIT (OUTPATIENT)
Age: 64
End: 2024-10-14
Payer: COMMERCIAL

## 2024-10-14 VITALS
DIASTOLIC BLOOD PRESSURE: 81 MMHG | HEART RATE: 73 BPM | OXYGEN SATURATION: 99 % | BODY MASS INDEX: 37.99 KG/M2 | SYSTOLIC BLOOD PRESSURE: 154 MMHG | HEIGHT: 65 IN | WEIGHT: 228 LBS | TEMPERATURE: 97.9 F

## 2024-10-14 DIAGNOSIS — Z51.81 ENCOUNTER FOR MONITORING ADJUVANT HORMONAL THERAPY: ICD-10-CM

## 2024-10-14 DIAGNOSIS — C50.411 MALIGNANT NEOPLASM OF UPPER-OUTER QUADRANT OF RIGHT BREAST IN FEMALE, ESTROGEN RECEPTOR POSITIVE (HCC): Primary | ICD-10-CM

## 2024-10-14 DIAGNOSIS — Z17.0 MALIGNANT NEOPLASM OF UPPER-OUTER QUADRANT OF RIGHT BREAST IN FEMALE, ESTROGEN RECEPTOR POSITIVE (HCC): Primary | ICD-10-CM

## 2024-10-14 DIAGNOSIS — Z79.899 ENCOUNTER FOR MONITORING ADJUVANT HORMONAL THERAPY: ICD-10-CM

## 2024-10-14 PROCEDURE — 99213 OFFICE O/P EST LOW 20 MIN: CPT | Performed by: INTERNAL MEDICINE

## 2024-10-14 NOTE — PROGRESS NOTES
[facility] [place]   at [facility]   [address] [facility], [address]   [place] [address]   [phone]      Oncology Note       Patient: Clare Villarreal  MRN: [id number]   SSN: [id number]    YOB: 1960                Diagnosis:        1. Right breast carcinoma:   T1b N0 M0 (Stage I) infiltrating ductal carcinoma, Tumor size two foci both 8 mm in size, LN -ve, grade 2, %, [place] 50%, Her 2 -ve           Treatment:        1. S/P right breast simple mastectomy 01/29/2020   2. Letrozole         Subjective:         Clare Villarreal is a 62 y.o. female who I am seeing in consultation for a new diagnosis of right sided invasive breast carcinoma. This was detected on screening mammogram, which led to diagnostic studies  and a biopsy. The patient mentioned having LEFT nipple drainage in the past and RIGHT breast infection but that there was no palpable lump or specific problem prior to this screening mammogram in October. She was seen by Dr. Ann. The biopsy revealed  grade 2 IDC % MO 50%. She underwent a right sided simple mastectomy and there were two foci of disease, both less than 1 cm in size.  She is taking Letrozole and reports mild hot flashes that are manageable. She is getting mammograms done at [facility]. Denies any new symptoms.         Review of Systems:      Constitutional: hot flashes   Eyes: negative   Ears, Nose, Mouth, Throat, and Face: negative   Respiratory: negative   Cardiovascular: negative   Gastrointestinal: negative   Genitourinary:negative   Integument/Breast: negative   Hematologic/Lymphatic: negative   Musculoskeletal:negative   Neurological: negative          Past Medical History:   Diagnosis Date    Breast cancer ([facility]) 2019    right    Cancer ([facility]) 11/2019    RIGHT breast    Diabetes ([facility])     Hypercholesterolemia     Hypertension        Past Surgical History:   Procedure Laterality Date    BREAST

## 2024-10-22 RX ORDER — ERGOCALCIFEROL 1.25 MG/1
CAPSULE, LIQUID FILLED ORAL
Qty: 12 CAPSULE | Refills: 0 | OUTPATIENT
Start: 2024-10-22

## (undated) DEVICE — BLADE SCALP SURG SAFLOK 15 --

## (undated) DEVICE — TRAP FLUID BUFFALO FLTR

## (undated) DEVICE — ROCKER SWITCH PENCIL BLADE ELECTRODE, HOLSTER: Brand: EDGE

## (undated) DEVICE — 450 ML BOTTLE OF 0.05% CHLORHEXIDINE GLUCONATE IN 99.95% STERILE WATER FOR IRRIGATION, USP AND APPLICATOR.: Brand: IRRISEPT ANTIMICROBIAL WOUND LAVAGE

## (undated) DEVICE — SYRINGE IRRIG 60ML SFT PLIABLE BLB EZ TO GRP 1 HND USE W/

## (undated) DEVICE — SPONGE GZ W4XL4IN COT 12 PLY TYP VII WVN C FLD DSGN

## (undated) DEVICE — Device

## (undated) DEVICE — MARKER SURG SKIN UTIL REGULAR/FINE 2 TIP W/ RUL AND 9 LBL

## (undated) DEVICE — SPONGE LAP 18X18IN STRL -- 5/PK

## (undated) DEVICE — RESERVOIR,SUCTION,100CC,SILICONE: Brand: MEDLINE

## (undated) DEVICE — INTENDED FOR TISSUE SEPARATION, AND OTHER PROCEDURES THAT REQUIRE A SHARP SURGICAL BLADE TO PUNCTURE OR CUT.: Brand: BARD-PARKER ® CARBON RIB-BACK BLADES

## (undated) DEVICE — SOLUTION IRRIG 1000ML 0.9% SOD CHL USP POUR PLAS BTL

## (undated) DEVICE — GLOVE ORANGE PI 7   MSG9070

## (undated) DEVICE — KIT DRN FLAT W/ 100CC EVAC 7MM FULL PERF

## (undated) DEVICE — STERILE POLYISOPRENE POWDER-FREE SURGICAL GLOVES: Brand: PROTEXIS

## (undated) DEVICE — SURGICAL PROCEDURE PACK BASIN MAJ SET CUST NO CAUT

## (undated) DEVICE — CURVED, SMALL JAW, OPEN SEALER/DIVIDER: Brand: LIGASURE

## (undated) DEVICE — DRAPE,CHEST,FENES,15X10,STERIL: Brand: MEDLINE

## (undated) DEVICE — BLADE ELECTRODE: Brand: EDGE

## (undated) DEVICE — PREP KIT PEEL PTCH POVIDONE IOD

## (undated) DEVICE — CHEST/BREAST-LF: Brand: MEDLINE INDUSTRIES, INC.

## (undated) DEVICE — GARMENT,MEDLINE,DVT,INT,CALF,MED, GEN2: Brand: MEDLINE

## (undated) DEVICE — NEEDLE HYPO 22GA L1.5IN BLK POLYPR HUB S STL REG BVL STR

## (undated) DEVICE — KIT,1200CC CANISTER,3/16"X6' TUBING: Brand: MEDLINE INDUSTRIES, INC.

## (undated) DEVICE — BANDAGE,GAUZE,BULKEE II,4.5"X4.1YD,STRL: Brand: MEDLINE

## (undated) DEVICE — TELFA ADHESIVE ISLAND DRESSING: Brand: TELFA

## (undated) DEVICE — SOLUTION IV 1000ML 0.9% SOD CHL

## (undated) DEVICE — AEGIS 1" DISK 4MM HOLE, PEEL OPEN: Brand: MEDLINE

## (undated) DEVICE — 4-PORT MANIFOLD: Brand: NEPTUNE 2

## (undated) DEVICE — SUTURE MCRYL SZ 3-0 L27IN ABSRB UD L19MM PS-2 3/8 CIR PRIM Y427H

## (undated) DEVICE — SUTURE MCRYL SZ 4-0 L27IN ABSRB UD L19MM PS-2 1/2 CIR PRIM Y426H

## (undated) DEVICE — SUT ETHLN 3-0 18IN PS1 BLK --

## (undated) DEVICE — SUT ETHLN 3-0 18IN PS2 BLK --

## (undated) DEVICE — COVER,TABLE,44X90,STERILE: Brand: MEDLINE

## (undated) DEVICE — SYSTEM IMPL DEL FOR BRST IMPL FUN (SEE COMMENT)

## (undated) DEVICE — 3M™ TEGADERM™ TRANSPARENT FILM DRESSING FRAME STYLE, 1628, 6 IN X 8 IN (15 CM X 20 CM), 10/CT 8CT/CASE: Brand: 3M™ TEGADERM™

## (undated) DEVICE — TUBING, SUCTION, 1/4" X 10', STRAIGHT: Brand: MEDLINE

## (undated) DEVICE — STRAP,POSITIONING,KNEE/BODY,FOAM,4X60": Brand: MEDLINE

## (undated) DEVICE — MARKER,SKIN,WI/RULER AND LABELS: Brand: MEDLINE

## (undated) DEVICE — SUTURE VCRL SZ 3-0 L18IN ABSRB UD PS-2 L19MM 3/8 CRV PRIM J497H

## (undated) DEVICE — DRAPE,REIN 53X77,STERILE: Brand: MEDLINE

## (undated) DEVICE — SUTURE PROL SZ 0 L30IN NONABSORBABLE BLU L40MM CT 1/2 CIR 8434H

## (undated) DEVICE — REM POLYHESIVE ADULT PATIENT RETURN ELECTRODE: Brand: VALLEYLAB

## (undated) DEVICE — NEEDLE HYPO 18GA L1IN PNK POLYPR HUB S STL REG BVL STR W/O

## (undated) DEVICE — STERILE POLYISOPRENE POWDER-FREE SURGICAL GLOVES WITH EMOLLIENT COATING: Brand: PROTEXIS

## (undated) DEVICE — DERMABOND SKIN ADH 0.7ML -- DERMABOND ADVANCED 12/BX

## (undated) DEVICE — ADHESIVE SKIN CLOSURE HI VISC MIC 0.5 CC PREMIERPRO EXOFIN

## (undated) DEVICE — SUTURE MCRYL SZ 3-0 L27IN ABSRB UD L24MM PS-1 3/8 CIR PRIM Y936H

## (undated) DEVICE — COVER US PRB W12XL244CM FLD IORT STR TIP

## (undated) DEVICE — STAPLER SKIN SQ 30 ABSRB STPL DISP INSORB

## (undated) DEVICE — NEEDLE HYPO 20GA L1.5IN YEL POLYPR HUB S STL REG BVL STR

## (undated) DEVICE — BLANKET WRM W25XL64IN NONWOVEN SFT LTWT PLIABLE HYPR

## (undated) DEVICE — (D)PREP SKN CHLRAPRP APPL 26ML -- CONVERT TO ITEM 371833

## (undated) DEVICE — PACK,BASIC,SIRUS,V: Brand: MEDLINE

## (undated) DEVICE — STERILE-Z STAND AND BACK TABLE COVER 66IN X 95IN: Brand: STERILE-Z

## (undated) DEVICE — SMOKE EVACUATION PENCIL: Brand: VALLEYLAB

## (undated) DEVICE — YANKAUER,BULB TIP,W/O VENT,RIGID,STERILE: Brand: MEDLINE

## (undated) DEVICE — INFECTION CONTROL KIT SYS

## (undated) DEVICE — MASTISOL ADHESIVE LIQ 2/3ML

## (undated) DEVICE — CHEST/BREAST-MRMCASU: Brand: MEDLINE INDUSTRIES, INC.

## (undated) DEVICE — HANDLE LT SNAP ON ULT DURABLE LENS FOR TRUMPF ALC DISPOSABLE

## (undated) DEVICE — TOWEL SURG W17XL27IN STD BLU COT NONFENESTRATED PREWASHED

## (undated) DEVICE — DBD-PACK,LAPAROTOMY,2 REINFORCED GOWNS: Brand: MEDLINE

## (undated) DEVICE — INSULATED BLADE ELECTRODE: Brand: EDGE

## (undated) DEVICE — APPLIER LIG CLP M L11IN TI STR RNG HNDL FOR 20 CLP DISP

## (undated) DEVICE — SOLUTION IRRIG 1000ML STRL H2O USP PLAS POUR BTL

## (undated) DEVICE — TOTAL TRAY, 16FR 10ML SIL FOLEY, URN: Brand: MEDLINE

## (undated) DEVICE — SUTURE PDS II SZ 2-0 L27IN ABSRB VLT L36MM CT-1 1/2 CIR Z339H

## (undated) DEVICE — SYR 50ML LR LCK 1ML GRAD NSAF --

## (undated) DEVICE — ELECTRODE BLDE L4IN NONINSULATED EDGE

## (undated) DEVICE — 4MM SINGLE USE CANNULA, 10MM PORT, 30CM LONG, MERCEDES: Brand: MICROAIRE®

## (undated) DEVICE — SOLUTION IRRIG 3000ML 0.9% SOD CHL FLX CONT 0797208] ICU MEDICAL INC]

## (undated) DEVICE — Z CONVERTED USE 2271116 TUBING ASPIR 9 FT STRL ULTRA-LIMP

## (undated) DEVICE — SUTURE MCRYL SZ 2-0 L27IN ABSRB VLT CT-1 L36MM 1/2 CIR TAPR Y339H

## (undated) DEVICE — NEEDLE HYPO 21GA L1.5IN INTRAMUSCULAR S STL LATCH BVL UP

## (undated) DEVICE — SUTURE PERMAHAND SZ 2-0 L18IN NONABSORBABLE BLK L26MM PS 1588H

## (undated) DEVICE — BRA SURG 3XL FOR 44 46IN CHST 96% COT 4% SPANDEX KNIT FAB

## (undated) DEVICE — HANDPIECE SET WITH COAXIAL HIGH FLOW TIP AND SUCTION TUBE: Brand: INTERPULSE

## (undated) DEVICE — SUTURE MCRYL SZ 2-0 L27IN ABSRB UD SH L26MM TAPERPOINT NDL Y417H